# Patient Record
Sex: MALE | Race: WHITE | NOT HISPANIC OR LATINO | Employment: OTHER | URBAN - METROPOLITAN AREA
[De-identification: names, ages, dates, MRNs, and addresses within clinical notes are randomized per-mention and may not be internally consistent; named-entity substitution may affect disease eponyms.]

---

## 2017-01-23 ENCOUNTER — OFFICE VISIT (OUTPATIENT)
Dept: NEUROSURGERY | Facility: CLINIC | Age: 52
End: 2017-01-23

## 2017-01-23 VITALS — BODY MASS INDEX: 31.71 KG/M2 | WEIGHT: 202 LBS | TEMPERATURE: 97.4 F | HEIGHT: 67 IN

## 2017-01-23 DIAGNOSIS — M50.30 DDD (DEGENERATIVE DISC DISEASE), CERVICAL: Primary | ICD-10-CM

## 2017-01-23 PROCEDURE — 99212 OFFICE O/P EST SF 10 MIN: CPT | Performed by: NEUROLOGICAL SURGERY

## 2017-01-23 NOTE — MR AVS SNAPSHOT
Charles Finney   1/23/2017 1:15 PM   Office Visit    Dept Phone:  [phone]   Encounter #:  [id number]    Provider:  Rakesh Best MD   Department:  [facility]                Your Full Care Plan              Your Updated Medication List          This list is accurate as of: 1/23/17  2:15 PM.  Always use your most recent med list.                albuterol 108 (90 BASE) MCG/ACT inhaler   Commonly known as:  PROVENTIL HFA;VENTOLIN HFA       diclofenac 75 MG EC tablet   Commonly known as:  VOLTAREN       EPIPEN JR 2-SYED 0.15 MG/0.3ML solution auto-injector injection   Generic drug:  EPINEPHrine       fluticasone 50 MCG/ACT nasal spray   Commonly known as:  FLONASE       gabapentin 100 MG capsule   Commonly known as:  NEURONTIN   Take 1 cap QD for 3 days, then take 1 cap BID for 3 days, then take 1 cap TID for 3 days, then take 1 cap QID and continue       lisinopril 20 MG tablet   Commonly known as:  PRINIVIL,ZESTRIL       nortriptyline 10 MG capsule   Commonly known as:  PAMELOR   Take 1-2 capsules QHS prn sleep/pain       pravastatin 40 MG tablet   Commonly known as:  PRAVACHOL               Instructions     None    Patient Instructions History      Upcoming Appointments     Visit Type Date Time Department    OFFICE VISIT 1/23/2017  1:15 PM [facility]      [facility]     Our records indicate that you have an active [employer] account.    You can view your After Visit Summary by going to [employer] and logging in with your [employer] username and password.  If you don't have a [employer] username and password but a parent or guardian has access to your record, the parent or guardian should login with their own [employer] username and password and access your record to view the After Visit Summary.    If you have questions, you can email [email or web address] or call [phone] to talk to our [employer]  "staff.  Remember, MyChart is NOT to be used for urgent needs.  For medical emergencies, dial 911.               Other Info from Your Visit           Allergies     Other      Environmental allergies    Penicillins        Reason for Visit     Follow-up           Vital Signs     Temperature Height Weight Body Mass Index Smoking Status       97.4 °F (36.3 °C) 67\" (170.2 cm) 202 lb (91.6 kg) 31.64 kg/m2 Never Smoker         "

## 2017-01-23 NOTE — LETTER
January 23, 2017     Caridad Kaba MD  [address] [place] [id number]    Patient: Charles Finney   YOB: 1965   Date of Visit: 1/23/2017       Dear Dr. Sendy MD:    Charles Finney was in my office today. Below is a copy of my note.    If you have questions, please do not hesitate to call me. I look forward to following Charles along with you.         Sincerely,        Rakesh Best MD        CC: MD Latrell Maddox MD    Subjective     Chief Complaint: Neck pain    Patient ID: Charles Finney is a 51 y.o. male is here today for follow-up.    Neck Pain    This is a chronic problem. The current episode started more than 1 month ago. The problem occurs intermittently. The problem has been waxing and waning. The pain is associated with nothing. The pain is present in the left side. The quality of the pain is described as aching and cramping. The pain is at a severity of 1/10. The pain is moderate. The pain is same all the time. Pertinent negatives include no chest pain, fever, headaches, numbness, pain with swallowing, photophobia, trouble swallowing or weakness. He has tried acetaminophen, home exercises, NSAIDs and ice for the symptoms.       This is a 51-year-old man with severe degenerative disc disease and a history of mild neck pain.  I did not recommend surgery based on my initial assessment of him last year.  He subsequently has started some nortriptyline and gabapentin which has significantly improved his symptoms.  He reports that he is happy and his discomfort is minimal at this point.    The following portions of the patient's history were reviewed and updated as appropriate: allergies, current medications, past family history, past medical history, past social history, past surgical history and problem list.    Family history:   Family History   Problem Relation Age of Onset   • Breast cancer Mother    • Diabetes Father    • Kidney failure Father        Social  history:   Social History     Social History   • Marital status: Single     Spouse name: N/A   • Number of children: N/A   • Years of education: N/A     Occupational History   • Not on file.     Social History Main Topics   • Smoking status: Never Smoker   • Smokeless tobacco: Never Used   • Alcohol use Yes      Comment: Rarely   • Drug use: No   • Sexual activity: Not on file     Other Topics Concern   • Not on file     Social History Narrative       Review of Systems   Constitutional: Negative for activity change, appetite change, chills, diaphoresis, fatigue, fever and unexpected weight change.   HENT: Negative for congestion, dental problem, drooling, ear discharge, ear pain, facial swelling, hearing loss, mouth sores, nosebleeds, postnasal drip, rhinorrhea, sinus pressure, sneezing, sore throat, tinnitus, trouble swallowing and voice change.    Eyes: Negative for photophobia, pain, discharge, redness, itching and visual disturbance.   Respiratory: Negative for apnea, cough, choking, chest tightness, shortness of breath, wheezing and stridor.    Cardiovascular: Negative for chest pain, palpitations and leg swelling.   Gastrointestinal: Negative for abdominal distention, abdominal pain, anal bleeding, blood in stool, constipation, diarrhea, nausea, rectal pain and vomiting.   Musculoskeletal: Positive for back pain, neck pain and neck stiffness. Negative for arthralgias, gait problem, joint swelling and myalgias.   Skin: Negative for color change, pallor, rash and wound.   Allergic/Immunologic: Negative for environmental allergies, food allergies and immunocompromised state.   Neurological: Negative for dizziness, tremors, seizures, syncope, facial asymmetry, speech difficulty, weakness, light-headedness, numbness and headaches.   Hematological: Negative for adenopathy. Does not bruise/bleed easily.   Psychiatric/Behavioral: Negative for agitation, behavioral problems, confusion, decreased concentration,  "dysphoric mood, self-injury, sleep disturbance and suicidal ideas. The patient is not nervous/anxious and is not hyperactive.        Objective   Temperature 97.4 °F (36.3 °C), height 67\" (170.2 cm), weight 202 lb (91.6 kg).  Body mass index is 31.64 kg/(m^2).    Physical Exam   Constitutional: He is oriented to person, place, and time. He appears well-developed.  Non-toxic appearance.   HENT:   Head: Normocephalic and atraumatic.   Right Ear: Hearing normal.   Left Ear: Hearing normal.   Nose: Nose normal.   Eyes: Conjunctivae, EOM and lids are normal. Pupils are equal, round, and reactive to light.   Neck: No JVD present. No muscular tenderness present. Decreased range of motion present.   Cardiovascular: Normal rate and regular rhythm.    Pulses:       Radial pulses are 2+ on the right side, and 2+ on the left side.   Pulmonary/Chest: Effort normal. No stridor. No respiratory distress. He has no wheezes.   Neurological: He is alert and oriented to person, place, and time. He has normal reflexes. He displays normal reflexes. No cranial nerve deficit. He exhibits normal muscle tone. GCS eye subscore is 4. GCS verbal subscore is 5. GCS motor subscore is 6.   Reflex Scores:       Tricep reflexes are 2+ on the right side and 2+ on the left side.       Bicep reflexes are 2+ on the right side and 2+ on the left side.       Brachioradialis reflexes are 2+ on the right side and 2+ on the left side.  Skin: Skin is warm and dry. No rash noted. No erythema.   Psychiatric: He has a normal mood and affect. His behavior is normal. Judgment and thought content normal.   Nursing note and vitals reviewed.        Assessment/Plan     Independent Review of Radiographic Studies:      He has no new imaging for me to review    Medical Decision Making:      This is a 51-year-old man with degenerative disc disease of the cervical spine.  His symptoms have significantly improved since starting gabapentin and nortriptyline.  I reviewed the " signs and symptoms of cervical myelopathy and cervical radiculopathy with him.  I directed him to contact my office with any new, or worsening symptoms.  I would like to follow up with him in 6 months if he needs it, otherwise he can follow-up with me on an as-needed basis.    There are no diagnoses linked to this encounter.    No Follow-up on file.           This document signed by ROLANDO Best MD January 23, 2017 1:46 PM

## 2017-01-23 NOTE — PROGRESS NOTES
Subjective     Chief Complaint: Neck pain    Patient ID: Charles Finney is a 51 y.o. male is here today for follow-up.    Neck Pain    This is a chronic problem. The current episode started more than 1 month ago. The problem occurs intermittently. The problem has been waxing and waning. The pain is associated with nothing. The pain is present in the left side. The quality of the pain is described as aching and cramping. The pain is at a severity of 1/10. The pain is moderate. The pain is same all the time. Pertinent negatives include no chest pain, fever, headaches, numbness, pain with swallowing, photophobia, trouble swallowing or weakness. He has tried acetaminophen, home exercises, NSAIDs and ice for the symptoms.       This is a 51-year-old man with severe degenerative disc disease and a history of mild neck pain.  I did not recommend surgery based on my initial assessment of him last year.  He subsequently has started some nortriptyline and gabapentin which has significantly improved his symptoms.  He reports that he is happy and his discomfort is minimal at this point.    The following portions of the patient's history were reviewed and updated as appropriate: allergies, current medications, past family history, past medical history, past social history, past surgical history and problem list.    Family history:   Family History   Problem Relation Age of Onset   • Breast cancer Mother    • Diabetes Father    • Kidney failure Father        Social history:   Social History     Social History   • Marital status: Single     Spouse name: N/A   • Number of children: N/A   • Years of education: N/A     Occupational History   • Not on file.     Social History Main Topics   • Smoking status: Never Smoker   • Smokeless tobacco: Never Used   • Alcohol use Yes      Comment: Rarely   • Drug use: No   • Sexual activity: Not on file     Other Topics Concern   • Not on file     Social History Narrative       Review of Systems  "  Constitutional: Negative for activity change, appetite change, chills, diaphoresis, fatigue, fever and unexpected weight change.   HENT: Negative for congestion, dental problem, drooling, ear discharge, ear pain, facial swelling, hearing loss, mouth sores, nosebleeds, postnasal drip, rhinorrhea, sinus pressure, sneezing, sore throat, tinnitus, trouble swallowing and voice change.    Eyes: Negative for photophobia, pain, discharge, redness, itching and visual disturbance.   Respiratory: Negative for apnea, cough, choking, chest tightness, shortness of breath, wheezing and stridor.    Cardiovascular: Negative for chest pain, palpitations and leg swelling.   Gastrointestinal: Negative for abdominal distention, abdominal pain, anal bleeding, blood in stool, constipation, diarrhea, nausea, rectal pain and vomiting.   Musculoskeletal: Positive for back pain, neck pain and neck stiffness. Negative for arthralgias, gait problem, joint swelling and myalgias.   Skin: Negative for color change, pallor, rash and wound.   Allergic/Immunologic: Negative for environmental allergies, food allergies and immunocompromised state.   Neurological: Negative for dizziness, tremors, seizures, syncope, facial asymmetry, speech difficulty, weakness, light-headedness, numbness and headaches.   Hematological: Negative for adenopathy. Does not bruise/bleed easily.   Psychiatric/Behavioral: Negative for agitation, behavioral problems, confusion, decreased concentration, dysphoric mood, self-injury, sleep disturbance and suicidal ideas. The patient is not nervous/anxious and is not hyperactive.        Objective   Temperature 97.4 °F (36.3 °C), height 67\" (170.2 cm), weight 202 lb (91.6 kg).  Body mass index is 31.64 kg/(m^2).    Physical Exam   Constitutional: He is oriented to person, place, and time. He appears well-developed.  Non-toxic appearance.   HENT:   Head: Normocephalic and atraumatic.   Right Ear: Hearing normal.   Left Ear: Hearing " normal.   Nose: Nose normal.   Eyes: Conjunctivae, EOM and lids are normal. Pupils are equal, round, and reactive to light.   Neck: No JVD present. No muscular tenderness present. Decreased range of motion present.   Cardiovascular: Normal rate and regular rhythm.    Pulses:       Radial pulses are 2+ on the right side, and 2+ on the left side.   Pulmonary/Chest: Effort normal. No stridor. No respiratory distress. He has no wheezes.   Neurological: He is alert and oriented to person, place, and time. He has normal reflexes. He displays normal reflexes. No cranial nerve deficit. He exhibits normal muscle tone. GCS eye subscore is 4. GCS verbal subscore is 5. GCS motor subscore is 6.   Reflex Scores:       Tricep reflexes are 2+ on the right side and 2+ on the left side.       Bicep reflexes are 2+ on the right side and 2+ on the left side.       Brachioradialis reflexes are 2+ on the right side and 2+ on the left side.  Skin: Skin is warm and dry. No rash noted. No erythema.   Psychiatric: He has a normal mood and affect. His behavior is normal. Judgment and thought content normal.   Nursing note and vitals reviewed.        Assessment/Plan     Independent Review of Radiographic Studies:      He has no new imaging for me to review    Medical Decision Making:      This is a 51-year-old man with degenerative disc disease of the cervical spine.  His symptoms have significantly improved since starting gabapentin and nortriptyline.  I reviewed the signs and symptoms of cervical myelopathy and cervical radiculopathy with him.  I directed him to contact my office with any new, or worsening symptoms.  I would like to follow up with him in 6 months if he needs it, otherwise he can follow-up with me on an as-needed basis.    There are no diagnoses linked to this encounter.    No Follow-up on file.           This document signed by ROLANDO Best MD January 23, 2017 1:46 PM

## 2017-02-20 RX ORDER — GABAPENTIN 100 MG/1
100 CAPSULE ORAL 4 TIMES DAILY
Qty: 120 CAPSULE | Refills: 6 | Status: SHIPPED | OUTPATIENT
Start: 2017-02-20 | End: 2017-09-01 | Stop reason: SDUPTHER

## 2017-02-20 RX ORDER — NORTRIPTYLINE HYDROCHLORIDE 10 MG/1
CAPSULE ORAL
Qty: 60 CAPSULE | Refills: 6 | Status: SHIPPED | OUTPATIENT
Start: 2017-02-20 | End: 2017-10-06

## 2017-02-20 RX ORDER — NORTRIPTYLINE HYDROCHLORIDE 10 MG/1
CAPSULE ORAL
Qty: 60 CAPSULE | Refills: 0 | Status: SHIPPED | OUTPATIENT
Start: 2017-02-20 | End: 2017-02-20 | Stop reason: SDUPTHER

## 2017-02-20 RX ORDER — NORTRIPTYLINE HYDROCHLORIDE 10 MG/1
CAPSULE ORAL
Qty: 60 CAPSULE | Refills: 6 | Status: SHIPPED | OUTPATIENT
Start: 2017-02-20 | End: 2017-02-20 | Stop reason: SDUPTHER

## 2017-02-20 RX ORDER — GABAPENTIN 100 MG/1
100 CAPSULE ORAL 4 TIMES DAILY
Qty: 120 CAPSULE | Refills: 6 | Status: SHIPPED | OUTPATIENT
Start: 2017-02-20 | End: 2017-02-20 | Stop reason: SDUPTHER

## 2017-09-01 RX ORDER — GABAPENTIN 100 MG/1
100 CAPSULE ORAL 4 TIMES DAILY
Qty: 120 CAPSULE | Refills: 5 | OUTPATIENT
Start: 2017-09-01 | End: 2017-10-06

## 2017-10-02 ENCOUNTER — TELEPHONE (OUTPATIENT)
Dept: NEUROSURGERY | Facility: CLINIC | Age: 52
End: 2017-10-02

## 2017-10-02 NOTE — TELEPHONE ENCOUNTER
Provider:  Nasir  Caller: patient  Time of call:  12:58   Phone #:  [phone]  Surgery:  no  Surgery Date:    Last visit :01/23/17     Next visit: None    RODDY:         Reason for call:       Patient is having increased pain with his cervical DDD.  He wants to know Dr. Best thoughts regarding seeing a chiropractor, doing possible acupuncture or using a conversion table.  Are any and all of these things ok for him to try?

## 2017-10-02 NOTE — TELEPHONE ENCOUNTER
Called patient and gave him this message. He is going to do some research on chiropractors and make a discission from there.

## 2017-10-02 NOTE — TELEPHONE ENCOUNTER
Dr. Best does not generally tell people to go to a chiropractor, but if he would like to go to one, he is not opposed.  He may utilize acupuncture.  He may use a conversion table under guidance..

## 2017-10-06 ENCOUNTER — OFFICE VISIT (OUTPATIENT)
Dept: NEUROSURGERY | Facility: CLINIC | Age: 52
End: 2017-10-06

## 2017-10-06 VITALS
SYSTOLIC BLOOD PRESSURE: 110 MMHG | TEMPERATURE: 97.6 F | BODY MASS INDEX: 33.21 KG/M2 | WEIGHT: 211.6 LBS | HEIGHT: 67 IN | DIASTOLIC BLOOD PRESSURE: 70 MMHG

## 2017-10-06 DIAGNOSIS — M50.30 DDD (DEGENERATIVE DISC DISEASE), CERVICAL: Primary | ICD-10-CM

## 2017-10-06 PROCEDURE — 99214 OFFICE O/P EST MOD 30 MIN: CPT | Performed by: NEUROLOGICAL SURGERY

## 2017-10-06 RX ORDER — AMLODIPINE BESYLATE 5 MG/1
TABLET ORAL
COMMUNITY
Start: 2017-09-28 | End: 2019-05-08

## 2017-10-06 RX ORDER — PRAVASTATIN SODIUM 80 MG/1
TABLET ORAL
COMMUNITY
Start: 2017-09-05 | End: 2022-11-29

## 2017-10-06 NOTE — PROGRESS NOTES
Subjective     Chief Complaint: Neck pain    Patient ID: Charles Finney is a 52 y.o. male is here today for follow-up.    Neck Pain    This is a chronic problem. The current episode started more than 1 year ago. The problem occurs constantly. The problem has been waxing and waning. The pain is associated with nothing. The pain is present in the midline and occipital region. The quality of the pain is described as aching. The pain is at a severity of 6/10. The pain is moderate. The symptoms are aggravated by bending, position and twisting. The pain is worse during the night. Stiffness is present in the morning. Pertinent negatives include no chest pain, fever, headaches, leg pain, numbness, pain with swallowing, paresis, photophobia, syncope, tingling, trouble swallowing, visual change, weakness or weight loss. He has tried chiropractic manipulation, home exercises and NSAIDs for the symptoms. The treatment provided moderate relief.       This is a 52-year-old man who I saw in consultation about 10 months ago for some chronic neck pain.  I sent him for some physical therapy, and reported that this was effective at alleviating his pain.  He has had several bouts of intermittent flareups of his neck pain over the past few months, and he called my office asking about additional conservative treatments.  I asked him to return to my office to review his symptoms, and discussed the role of pain management, and physical therapy in the management of axial neck pain.    He endorses primarily neck pain with occasional radiating pain into his right shoulder blade.  He denies radiating pain or numbness into his arms or hands.    The following portions of the patient's history were reviewed and updated as appropriate: allergies, current medications, past family history, past medical history, past social history, past surgical history and problem list.    Family history:   Family History   Problem Relation Age of Onset   • Breast  cancer Mother    • Diabetes Father    • Kidney failure Father        Social history:   Social History     Social History   • Marital status: Single     Spouse name: N/A   • Number of children: N/A   • Years of education: N/A     Occupational History   • Not on file.     Social History Main Topics   • Smoking status: Never Smoker   • Smokeless tobacco: Never Used   • Alcohol use Yes      Comment: Rarely   • Drug use: No   • Sexual activity: Not on file     Other Topics Concern   • Not on file     Social History Narrative       Review of Systems   Constitutional: Negative for activity change, appetite change, chills, diaphoresis, fatigue, fever, unexpected weight change and weight loss.   HENT: Negative for congestion, dental problem, drooling, ear discharge, ear pain, facial swelling, hearing loss, mouth sores, nosebleeds, postnasal drip, rhinorrhea, sinus pressure, sneezing, sore throat, tinnitus, trouble swallowing and voice change.    Eyes: Negative for photophobia, pain, discharge, redness, itching and visual disturbance.   Respiratory: Negative for apnea, cough, choking, chest tightness, shortness of breath, wheezing and stridor.    Cardiovascular: Negative for chest pain, palpitations, leg swelling and syncope.   Gastrointestinal: Negative for abdominal distention, abdominal pain, anal bleeding, blood in stool, constipation, diarrhea, nausea, rectal pain and vomiting.   Endocrine: Negative for cold intolerance, heat intolerance, polydipsia, polyphagia and polyuria.   Genitourinary: Negative for decreased urine volume, difficulty urinating, dysuria, enuresis, flank pain, frequency, genital sores, hematuria and urgency.   Musculoskeletal: Positive for arthralgias and myalgias. Negative for back pain, gait problem, joint swelling, neck pain and neck stiffness.   Skin: Negative for color change, pallor, rash and wound.   Allergic/Immunologic: Negative for environmental allergies, food allergies and  "immunocompromised state.   Neurological: Negative for dizziness, tingling, tremors, seizures, syncope, facial asymmetry, speech difficulty, weakness, light-headedness, numbness and headaches.   Hematological: Negative for adenopathy. Does not bruise/bleed easily.   Psychiatric/Behavioral: Negative for agitation, behavioral problems, confusion, decreased concentration, dysphoric mood, hallucinations, self-injury, sleep disturbance and suicidal ideas. The patient is not nervous/anxious and is not hyperactive.    All other systems reviewed and are negative.      Objective   Blood pressure 110/70, temperature 97.6 °F (36.4 °C), temperature source Temporal Artery , height 67\" (170.2 cm), weight 211 lb 9.6 oz (96 kg).  Body mass index is 33.14 kg/(m^2).    Physical Exam   Constitutional: He is oriented to person, place, and time. He appears well-developed.  Non-toxic appearance.   HENT:   Head: Normocephalic and atraumatic.   Right Ear: Hearing normal.   Left Ear: Hearing normal.   Nose: Nose normal.   Eyes: Conjunctivae, EOM and lids are normal. Pupils are equal, round, and reactive to light.   Neck: Normal range of motion. No JVD present.   Cardiovascular: Normal rate and regular rhythm.    Pulses:       Radial pulses are 2+ on the right side, and 2+ on the left side.   Pulmonary/Chest: Effort normal. No stridor. No respiratory distress. He has no wheezes.   Musculoskeletal:        Cervical back: He exhibits decreased range of motion.   Neurological: He is alert and oriented to person, place, and time. He has normal reflexes. He displays normal reflexes. No cranial nerve deficit. He exhibits normal muscle tone. GCS eye subscore is 4. GCS verbal subscore is 5. GCS motor subscore is 6.   Reflex Scores:       Tricep reflexes are 2+ on the right side and 2+ on the left side.       Bicep reflexes are 2+ on the right side and 2+ on the left side.       Brachioradialis reflexes are 2+ on the right side and 2+ on the left " side.  Skin: Skin is warm and dry. No rash noted. No erythema.   Psychiatric: He has a normal mood and affect. His behavior is normal. Judgment and thought content normal.   Nursing note and vitals reviewed.        Assessment/Plan     Independent Review of Radiographic Studies:      He has no new imaging for me to review    Medical Decision Making:      This is a 52-year-old man with intermittent, chronic neck pain.  He has no real complaints of cervical radiculopathy.  A lengthy, and protracted conversation was held with the patient regarding the role of pain management and physical therapy in the management of chronic, axial neck pain.    I did review the signs and symptoms of cervical radiculopathy and cervical myelopathy with him.  I directed him to contact my office with new, or worsening symptoms.    He can follow-up with me on an as-needed basis.    Charles was seen today for neck pain.    Diagnoses and all orders for this visit:    DDD (degenerative disc disease), cervical  -     Ambulatory Referral to Physical Therapy Evaluate and treat      Return if symptoms worsen or fail to improve.           This document signed by ROLANDO Best MD October 6, 2017 1:26 PM

## 2017-11-14 ENCOUNTER — LAB REQUISITION (OUTPATIENT)
Dept: LAB | Facility: HOSPITAL | Age: 52
End: 2017-11-14

## 2017-11-14 DIAGNOSIS — D12.5 BENIGN NEOPLASM OF SIGMOID COLON: ICD-10-CM

## 2017-11-14 DIAGNOSIS — D12.3 BENIGN NEOPLASM OF TRANSVERSE COLON: ICD-10-CM

## 2017-11-14 DIAGNOSIS — Z12.11 ENCOUNTER FOR SCREENING FOR MALIGNANT NEOPLASM OF COLON: ICD-10-CM

## 2017-11-14 PROCEDURE — 88305 TISSUE EXAM BY PATHOLOGIST: CPT | Performed by: INTERNAL MEDICINE

## 2017-11-15 LAB
CYTO UR: NORMAL
LAB AP CASE REPORT: NORMAL
LAB AP CLINICAL INFORMATION: NORMAL
Lab: NORMAL
PATH REPORT.FINAL DX SPEC: NORMAL
PATH REPORT.GROSS SPEC: NORMAL

## 2018-10-03 ENCOUNTER — TELEPHONE (OUTPATIENT)
Dept: PAIN MEDICINE | Facility: CLINIC | Age: 53
End: 2018-10-03

## 2018-10-04 ENCOUNTER — TELEPHONE (OUTPATIENT)
Dept: NEUROSURGERY | Facility: CLINIC | Age: 53
End: 2018-10-04

## 2018-10-10 NOTE — PROGRESS NOTES
"Chief Complaint: \"I have pain in the right side of my neck and right arm.\"    History of Present Illness:   Patient: Mr. Charles Finney, 51 y.o. male   Pain history: Patient reports a 3-4 year history of pain, which began without incident. He initially saw Dr. Solis in 2016, and epidurals were discussed, but were never scheduled.  Patient states his pain had improved somewhat until sharpening a mower blade at the end of September.  Patient reports neck stiffness upon recurrence of symptoms, but this seems to have improved.  He reports that his pain has actually improved some this week after re-starting gabapentin.  TENS unit has also been helpful.  Pain description: constant pain with intermittent exacerbation, described as aching and burning sensation.   Radiation of pain: The pain radiates from the neck to the right shoulder and into the right hand.  The pain in the right shoulder is more severe than the neck and arm pain.   Pain intensity today: 3/10 (shoulder), 2/10 (neck)  Average pain intensity last week: 8/10 (shoulder), 8/10 (neck)  Pain intensity ranges from: 2-3/10 to 8/10  Aggravating factors: Pain increases with flexion and rotation of the cervical spine   Alleviating factors: Pain decreases with gabapentin, Tylenol, and TENS unit   Associated symptoms:   Patient denies numbness or weakness in the upper extremities  Patient denies any new bladder or bowel problems.   Patient denies difficulties with his balance.   Patient denies headaches.  In terms of current analgesics, Charles Finney takes: diclofenac, Tylenol, and gabapentin  Charles Finney has already failed the following measures, including:   Conservative measures: oral analgesics, topical analgesics, physical therapy, ice, heat and home exercises, NSAIDs   Interventional measures: None  Surgical measures: None  Charles Finney underwent neurosurgical  consultation with Dr. Best on 10/06/2017, and was found not to be a surgical " candidate.  Charles Finney has a history of hypertension and is engaged in treatment.  RODDY Report #[id number] consistent to medication reconciliation.    Review of diagnostic Studies:   MRI, cervical spine, without, 08/30/2016; The cord signal is normal. C3-C4: Facet hypertrophy, disc osteophyte complex, moderate central canal narrowing. C4-C5: Facet and uncovertebral joint hypertrophy. Disc osteophyte. Moderate to severe central canal and bilateral neural foraminal narrowing. C5-C6: Facet and uncovertebral joint hypertrophy. Disc osteophyte. Severe central canal and bilateral neural foraminal narrowing C6-C7: Facet and uncovertebral joint hypertrophy. Mild central canal and bilateral neural foraminal narrowing. C7-T1: Facet hypertrophy no stenosis.    Global Pain Scale 10-          Pain 12          Feelings 0          Clinical outcomes 19          Activities 18          GPS Total: 49            Review of Systems   Respiratory: Positive for apnea.    Musculoskeletal: Positive for back pain, neck pain and neck stiffness.   Neurological: Positive for numbness.   All other systems reviewed and are negative.     Patient Active Problem List   Diagnosis   • Cervical stenosis of spinal canal   • Foraminal stenosis of cervical region   • Cervical spondylosis without myelopathy   • DDD (degenerative disc disease), cervical   • Essential hypertension   • Mild obesity   • Hypercholesterolemia   • Mild intermittent asthma without complication     Past Medical History:   Diagnosis Date   • Asthma    • Back problem    • Cervical disc disorder    • Chronic pain disorder    • Hypertension    • Joint pain    • Low back pain    • Lumbosacral disc disease    • Neck pain    • Umbilical hernia       Past Surgical History:   Procedure Laterality Date   • KIDNEY STONE SURGERY  01/01/2013    Lithotripsy   • KNEE CARTILAGE SURGERY  01/01/1991   • NASAL SEPTUM SURGERY  05/01/2016   • TONSILLECTOMY     • UMBILICAL HERNIA REPAIR   "01/01/1999         Family History   Problem Relation Age of Onset   • Breast cancer Mother    • Diabetes Father    • Kidney failure Father          Social History     Social History   • Marital status: Single     Social History Main Topics   • Smoking status: Never Smoker   • Smokeless tobacco: Never Used   • Alcohol use Yes      Comment: Rarely   • Drug use: No     Other Topics Concern   • Not on file        Current Outpatient Prescriptions:   •  albuterol (PROVENTIL HFA;VENTOLIN HFA) 108 (90 BASE) MCG/ACT inhaler, Inhale 2 puffs every 4 (four) hours as needed for wheezing., Disp: , Rfl:   •  amLODIPine (NORVASC) 5 MG tablet, , Disp: , Rfl:   •  DICLOFENAC PO, Take  by mouth., Disp: , Rfl:   •  FLOVENT DISKUS 250 MCG/BLIST aerosol powder , , Disp: , Rfl:   •  fluticasone (FLONASE) 50 MCG/ACT nasal spray, 2 sprays into each nostril daily., Disp: , Rfl:   •  gabapentin (NEURONTIN) 100 MG capsule, Take 100 mg by mouth 3 (Three) Times a Day., Disp: , Rfl:   •  lisinopril (PRINIVIL,ZESTRIL) 20 MG tablet, Take 20 mg by mouth 2 (Two) Times a Day., Disp: , Rfl:   •  nortriptyline (PAMELOR) 10 MG capsule, Take 10 mg by mouth Every Night., Disp: , Rfl:   •  pravastatin (PRAVACHOL) 80 MG tablet, , Disp: , Rfl:       Allergies   Allergen Reactions   • Other      Environmental allergies     • Penicillins          /88   Pulse 80   Temp 96.9 °F (36.1 °C) (Temporal Artery )   Resp 18   Ht 170.2 cm (67\")   Wt 98.2 kg (216 lb 6.4 oz)   SpO2 96%   BMI 33.89 kg/m²    Physical Exam:  Constitutional: Patient is oriented to person, place, and time.  Patient appears well-developed and well-nourished.   HENT: Head: Normocephalic and atraumatic. Eyes: Conjunctivae and lids are normal. Pupils: Equal, round, reactive to light.   Neck: Trachea normal. Neck supple. No JVD present.   Lymphatic: No cervical adenopathy  Musculoskeletal   Gait and station: Gait evaluation demonstrated a normal gait.   Cervical spine: Passive and active " range of motion is decreased and without pain. Extension, flexion, lateral flexion, rotation of the cervical spine did not increase or reproduce pain. Cervical facet joint loading maneuvers are negative.   Shoulders: The range of motion of the glenohumeral joints is full and without pain. Rotator cuff strength is 5/5.   Neurological:   Patient is alert and oriented to person, place, and time. Speech: speech is normal. Cortical function: Normal mental status.   Cranial nerves: Cranial nerves 2-12 intact.   Reflex Scores:  Right brachioradialis: 2+  Left brachioradialis: 2+  Right biceps: 2+  Left biceps: 2+  Right triceps: 2+  Left triceps: 2+  Right patellar: 2+  Left patellar: 2+  Right achilles: 2+  Left achilles: 2+  Motor strength: 5/5  Motor Tone: normal tone.   Involuntary movements: none.   Superficial/Primitive Reflexes: primitive reflexes were absent.   Right Pierre: absent  Left Pierre: absent  Right ankle clonus: absent  Left ankle clonus: absent   Spurling sign is negative.   Sensation: No sensory loss. Sensory exam: intact to light touch, intact pain and temperature sensation, intact vibration sensation and normal proprioception.   Coordination: Normal finger to nose and heel to shin. Normal balance and negative. Romberg's sign negative.   Skin and subcutaneous tissue: Skin is warm and intact. No rash noted. No cyanosis.   Psychiatric:   Judgment and insight: Normal.   Orientation to person, place and time: Normal.   Recent and remote memory: Intact.   Mood and affect: Normal.     ASSESSMENT:   1. Cervical stenosis of spinal canal    2. Foraminal stenosis of cervical region    3. DDD (degenerative disc disease), cervical    4. Cervical spondylosis without myelopathy      PLAN:  I have reviewed the above medical records, and discussed the patient with Dr. Solis.  Dr. Solis also examined the patient.  1. Interventional pain management measures: Patient will be scheduled for cervical epidural  steroid injection by right paramedian interlaminar approach. We may repeat another epidural depending on patient's outcome. Patient will follow-up with Dr. Best thereafter if he fails to experience lasting relief from epidural.  2. Long-term rehabilitation efforts:  A. Patient will start a comprehensive physical therapy program for upper body strengthening/posture correction, gait and balance training, neurodynamics, E-STIM, myofascial release, cupping and dry needling  B. Start an exercise program such as daily walks for fitness  3. Patient has been screened for tobacco use: Current tobacco non-user  4. Pharmacological measures:  A. Continue gabapentin 100 mg four times per day   B. Continue nortriptyline 10 mg 1-2 tablets at bedtime as needed  C. Continue diclofenac, as currently prescribed  5. The patient has been instructed to contact my office with any questions or difficulties. The patient understands the plan and agrees to proceed accordingly.       Patient Care Team:  Caridad Kaba MD as PCP - General (Family Medicine)  Rakesh Best MD as Consulting Physician (Neurosurgery)  John Del Castillo MD as Consulting Physician (Cardiology)  Latrell Solis MD as Consulting Physician (Anesthesiology)  Caridad Kaba MD as Referring Physician (Family Medicine)       No orders of the defined types were placed in this encounter.          Future Appointments  Date Time Provider Department Center   10/29/2018 10:00 AM Latrell Solis MD [facility] None         Thomas Brar PA-C

## 2018-10-11 ENCOUNTER — OFFICE VISIT (OUTPATIENT)
Dept: PAIN MEDICINE | Facility: CLINIC | Age: 53
End: 2018-10-11

## 2018-10-11 VITALS
HEIGHT: 67 IN | OXYGEN SATURATION: 96 % | DIASTOLIC BLOOD PRESSURE: 88 MMHG | SYSTOLIC BLOOD PRESSURE: 130 MMHG | RESPIRATION RATE: 18 BRPM | HEART RATE: 80 BPM | TEMPERATURE: 96.9 F | BODY MASS INDEX: 33.97 KG/M2 | WEIGHT: 216.4 LBS

## 2018-10-11 DIAGNOSIS — M50.30 DDD (DEGENERATIVE DISC DISEASE), CERVICAL: ICD-10-CM

## 2018-10-11 DIAGNOSIS — M48.02 FORAMINAL STENOSIS OF CERVICAL REGION: ICD-10-CM

## 2018-10-11 DIAGNOSIS — M48.02 CERVICAL STENOSIS OF SPINAL CANAL: Primary | ICD-10-CM

## 2018-10-11 DIAGNOSIS — M47.812 CERVICAL SPONDYLOSIS WITHOUT MYELOPATHY: ICD-10-CM

## 2018-10-11 PROCEDURE — 99213 OFFICE O/P EST LOW 20 MIN: CPT | Performed by: PHYSICIAN ASSISTANT

## 2018-10-11 RX ORDER — GABAPENTIN 100 MG/1
100 CAPSULE ORAL 3 TIMES DAILY
COMMUNITY
End: 2018-11-07 | Stop reason: SDUPTHER

## 2018-10-11 RX ORDER — NORTRIPTYLINE HYDROCHLORIDE 10 MG/1
10 CAPSULE ORAL NIGHTLY
COMMUNITY
End: 2020-01-08 | Stop reason: DRUGHIGH

## 2018-10-29 ENCOUNTER — OUTSIDE FACILITY SERVICE (OUTPATIENT)
Dept: PAIN MEDICINE | Facility: CLINIC | Age: 53
End: 2018-10-29

## 2018-10-29 DIAGNOSIS — M48.02 FORAMINAL STENOSIS OF CERVICAL REGION: ICD-10-CM

## 2018-10-29 DIAGNOSIS — M48.02 CERVICAL STENOSIS OF SPINAL CANAL: Primary | ICD-10-CM

## 2018-10-29 DIAGNOSIS — M47.812 CERVICAL SPONDYLOSIS WITHOUT MYELOPATHY: ICD-10-CM

## 2018-10-29 DIAGNOSIS — M50.30 DDD (DEGENERATIVE DISC DISEASE), CERVICAL: ICD-10-CM

## 2018-10-29 PROCEDURE — 62321 NJX INTERLAMINAR CRV/THRC: CPT | Performed by: ANESTHESIOLOGY

## 2018-10-29 PROCEDURE — 99152 MOD SED SAME PHYS/QHP 5/>YRS: CPT | Performed by: ANESTHESIOLOGY

## 2018-10-30 ENCOUNTER — TELEPHONE (OUTPATIENT)
Dept: PAIN MEDICINE | Facility: CLINIC | Age: 53
End: 2018-10-30

## 2018-10-30 NOTE — TELEPHONE ENCOUNTER
Patient had cervical epidural steroid injection on 10/29/2018. He reports that he is doing well. He doesn't have any pain or soreness. He isn't sure yet where he is going to go to physical therapy at

## 2018-11-07 RX ORDER — GABAPENTIN 100 MG/1
CAPSULE ORAL
Qty: 120 CAPSULE | Refills: 5 | OUTPATIENT
Start: 2018-11-07 | End: 2019-05-08 | Stop reason: SDUPTHER

## 2018-11-19 NOTE — PROGRESS NOTES
"Chief Complaint: \"I' doing great after the procedure.\"    History of Present Illness:   Patient: Mr. Charles Finney, 51 y.o. male with a 3-4 year history of neck pain, which began without incident.  He presents today for post-procedure follow-up.  Patient was last seen on 10/29/2018 for cervical epidural steroid injection at C6-C7 by right interlaminar approach.  Patient reportedly experienced at least 90% pain relief that is ongoing.  Patient is participating in Physical Therapy.    Pain description: Previously constant pain with intermittent exacerbation, described as aching and burning sensation.   Radiation of pain: The pain radiated from the neck to the right shoulder and into the right hand.  The pain in the right shoulder was more severe than the neck and arm pain.   Pain intensity today: 0/10  Average pain intensity last week: 1/10  Pain intensity ranges from: 0/10 to 2/10  Aggravating factors: Pain increased with flexion and rotation of the cervical spine.   Alleviating factors: Pain decreased with gabapentin, Tylenol, and TENS unit   Associated symptoms:   Patient denies numbness or weakness in the upper extremities  Patient denies any new bladder or bowel problems.   Patient denies difficulties with his balance.   Patient denies headaches.  In terms of current analgesics, Charles Finney takes: diclofenac, nortriptyline, and gabapentin  Charles Finney has already failed the following measures, including:   Conservative measures: oral analgesics, topical analgesics, physical therapy, ice, heat and home exercises, NSAIDs   Interventional measures: As referenced above.  Surgical measures: None  Charles Finney underwent neurosurgical  consultation with Dr. Best on 10/06/2017, and was found not to be a surgical candidate.  Charles Finney has a history of hypertension and is engaged in treatment.  R[facility] Report #[id number] consistent to medication reconciliation.    Review of Diagnostic Studies:   MRI, cervical " spine, without, 08/30/2016; The cord signal is normal. C3-C4: Facet hypertrophy, disc osteophyte complex, moderate central canal narrowing. C4-C5: Facet and uncovertebral joint hypertrophy. Disc osteophyte. Moderate to severe central canal and bilateral neural foraminal narrowing. C5-C6: Facet and uncovertebral joint hypertrophy. Disc osteophyte. Severe central canal and bilateral neural foraminal narrowing C6-C7: Facet and uncovertebral joint hypertrophy. Mild central canal and bilateral neural foraminal narrowing. C7-T1: Facet hypertrophy no stenosis.    Global Pain Scale 10- 11-         Pain 12 4         Feelings 0 0         Clinical outcomes 19 0         Activities 18 0         GPS Total: 49 4           Review of Systems   Musculoskeletal: Positive for back pain, neck pain and neck stiffness.   Neurological: Positive for numbness.   All other systems reviewed and are negative.     Patient Active Problem List   Diagnosis   • Cervical stenosis of spinal canal   • Foraminal stenosis of cervical region   • Cervical spondylosis without myelopathy   • DDD (degenerative disc disease), cervical   • Essential hypertension   • Mild obesity   • Hypercholesterolemia   • Mild intermittent asthma without complication     Past Medical History:   Diagnosis Date   • Asthma    • Back problem    • Cervical disc disorder    • Chronic pain disorder    • Hypertension    • Joint pain    • Low back pain    • Lumbosacral disc disease    • Neck pain    • Umbilical hernia       Past Surgical History:   Procedure Laterality Date   • KIDNEY STONE SURGERY  01/01/2013    Lithotripsy   • KNEE CARTILAGE SURGERY  01/01/1991   • NASAL SEPTUM SURGERY  05/01/2016   • TONSILLECTOMY     • UMBILICAL HERNIA REPAIR  01/01/1999         Family History   Problem Relation Age of Onset   • Breast cancer Mother    • Diabetes Father    • Kidney failure Father          Social History     Socioeconomic History   • Marital status: Single     Spouse  "name: Not on file   • Number of children: Not on file   • Years of education: Not on file   • Highest education level: Not on file   Tobacco Use   • Smoking status: Never Smoker   • Smokeless tobacco: Never Used   Substance and Sexual Activity   • Alcohol use: Yes     Comment: Rarely   • Drug use: No        Current Outpatient Medications:   •  albuterol (PROVENTIL HFA;VENTOLIN HFA) 108 (90 BASE) MCG/ACT inhaler, Inhale 2 puffs every 4 (four) hours as needed for wheezing., Disp: , Rfl:   •  amLODIPine (NORVASC) 5 MG tablet, , Disp: , Rfl:   •  DICLOFENAC PO, Take  by mouth., Disp: , Rfl:   •  FLOVENT DISKUS 250 MCG/BLIST aerosol powder , , Disp: , Rfl:   •  fluticasone (FLONASE) 50 MCG/ACT nasal spray, 2 sprays into each nostril daily., Disp: , Rfl:   •  gabapentin (NEURONTIN) 100 MG capsule, TAKE ONE CAPSULE BY MOUTH 4 TIMES DAILY, Disp: 120 capsule, Rfl: 5  •  lisinopril (PRINIVIL,ZESTRIL) 20 MG tablet, Take 20 mg by mouth 2 (Two) Times a Day., Disp: , Rfl:   •  nortriptyline (PAMELOR) 10 MG capsule, Take 10 mg by mouth Every Night., Disp: , Rfl:   •  pravastatin (PRAVACHOL) 80 MG tablet, , Disp: , Rfl:       Allergies   Allergen Reactions   • Other      Environmental allergies     • Penicillins          /80   Pulse 82   Temp 97.9 °F (36.6 °C) (Temporal)   Resp 18   Ht 170.2 cm (67\")   Wt 99.7 kg (219 lb 12.8 oz)   SpO2 98%   BMI 34.43 kg/m²    Physical Exam:  Constitutional: Patient is oriented to person, place, and time.  Patient appears well-developed and well-nourished.   HENT: Head: Normocephalic and atraumatic. Eyes: Conjunctivae and lids are normal. Pupils: Equal, round, reactive to light.   Neck: Trachea normal. Neck supple. No JVD present.   Lymphatic: No cervical adenopathy  Musculoskeletal   Gait and station: Normal.   Cervical spine: Passive and active range of motion is full and without pain. Extension, flexion, lateral flexion, rotation of the cervical spine did not increase or reproduce " pain. Cervical facet joint loading maneuvers are negative.   Shoulders: The range of motion of the glenohumeral joints is full and without pain. Rotator cuff strength is 5/5.   Neurological:   Patient is alert and oriented to person, place, and time. Speech: speech is normal. Cortical function: Normal mental status.   Cranial nerves: Cranial nerves 2-12 intact.   Reflex Scores:  Brachioradialis: 2+ bilaterally  Biceps: 2+ bilaterally  Triceps: 2+ bilaterally  Patellar: 2+ bilaterally  Achilles: 2+ bilaterally  Motor strength: 5/5  Motor Tone: normal tone.   Involuntary movements: none.   Right ankle clonus: absent  Left ankle clonus: absent   Spurling sign is negative.   Sensation: No sensory loss. Sensory exam: intact to light touch, intact pain and temperature sensation, intact vibration sensation and normal proprioception.   Coordination: Normal finger to nose and heel to shin. Normal balance and negative. Romberg's sign negative.   Skin and subcutaneous tissue: Skin is warm and intact. No rash noted. No cyanosis.   Psychiatric:   Judgment and insight: Normal.   Orientation to person, place and time: Normal.   Recent and remote memory: Intact.   Mood and affect: Normal.     ASSESSMENT:   1. Cervical stenosis of spinal canal    2. Foraminal stenosis of cervical region    3. DDD (degenerative disc disease), cervical    4. Cervical spondylosis without myelopathy      PLAN:  I have reviewed the above medical records, and discussed the patient with Dr. Solis.    1. Interventional pain management measures: None indicated at this time.  Patient can be scheduled for repeat cervical epidural steroid injection at C6-C7 by right paramedian interlaminar approach if pain recurs.  Patient will follow-up with Dr. Best thereafter if he fails to experience relief from epidural.  2. Long-term rehabilitation efforts:  A. Patient will continue a comprehensive physical therapy program for upper body strengthening/posture  correction, gait and balance training, neurodynamics, E-STIM, myofascial release, cupping and dry needling  B. Start an exercise program such as daily walks for fitness  3. Pharmacological measures:  A. Continue gabapentin 100 mg four times per day   B. Continue nortriptyline 10 mg 1-2 tablets at bedtime as needed  C. Continue diclofenac, as currently prescribed  4. The patient has been instructed to contact my office with any questions or difficulties. The patient understands the plan and agrees to proceed accordingly.       Patient Care Team:  Caridad Kaba MD as PCP - General (Family Medicine)  Rakesh Best MD as Consulting Physician (Neurosurgery)  John Del Castillo MD as Consulting Physician (Cardiology)  Latrell Solis MD as Consulting Physician (Anesthesiology)  Caridad Kaba MD as Referring Physician (Family Medicine)       No orders of the defined types were placed in this encounter.          No future appointments.      Thomas Brar PA-C

## 2018-11-26 ENCOUNTER — OFFICE VISIT (OUTPATIENT)
Dept: PAIN MEDICINE | Facility: CLINIC | Age: 53
End: 2018-11-26

## 2018-11-26 VITALS
DIASTOLIC BLOOD PRESSURE: 80 MMHG | WEIGHT: 219.8 LBS | BODY MASS INDEX: 34.5 KG/M2 | HEIGHT: 67 IN | RESPIRATION RATE: 18 BRPM | HEART RATE: 82 BPM | SYSTOLIC BLOOD PRESSURE: 118 MMHG | TEMPERATURE: 97.9 F | OXYGEN SATURATION: 98 %

## 2018-11-26 DIAGNOSIS — M47.812 CERVICAL SPONDYLOSIS WITHOUT MYELOPATHY: ICD-10-CM

## 2018-11-26 DIAGNOSIS — M48.02 CERVICAL STENOSIS OF SPINAL CANAL: Primary | ICD-10-CM

## 2018-11-26 DIAGNOSIS — M50.30 DDD (DEGENERATIVE DISC DISEASE), CERVICAL: ICD-10-CM

## 2018-11-26 DIAGNOSIS — M48.02 FORAMINAL STENOSIS OF CERVICAL REGION: ICD-10-CM

## 2018-11-26 PROCEDURE — 99213 OFFICE O/P EST LOW 20 MIN: CPT | Performed by: PHYSICIAN ASSISTANT

## 2019-05-07 RX ORDER — GABAPENTIN 100 MG/1
CAPSULE ORAL
Qty: 120 CAPSULE | Refills: 5 | OUTPATIENT
Start: 2019-05-07

## 2019-05-07 NOTE — TELEPHONE ENCOUNTER
Received refill request for Gabapentin. Patient last seen 11/26/18. Jag report # [id number] scanned to media. Called patient and scheduled med refill appointment with Thomas Brar PA-C

## 2019-05-07 NOTE — PROGRESS NOTES
"Chief Complaint: \"Medication refill.\"    History of Present Illness:   Patient: Mr. Charles Finney, 51 y.o. male with a 3-4 year history of neck pain, which began without incident.  He returns today for medication refill.  He takes 100 mg of gabapentin four times daily without adverse effects.  Patient was seen on 10/29/2018 for cervical epidural steroid injection at C6-C7 by right interlaminar approach and experienced at least 90% pain relief.  He reports that the pain is gradually recurring, but is still manageable.     Pain description: Previously constant pain with intermittent exacerbation, described as aching and burning sensation.   Radiation of pain: The pain radiates from the neck to the right shoulder and into the right hand.  The pain in the right shoulder was more severe than the neck and arm pain.   Pain intensity today: 0/10  Average pain intensity last week: 3/10  Pain intensity ranges from: 0/10 to 5/10  Aggravating factors: Pain increases with flexion and rotation of the cervical spine.   Alleviating factors: Pain decreases with gabapentin, Tylenol, and TENS unit   Associated symptoms:   Patient denies numbness or weakness in the upper extremities  Patient denies any new bladder or bowel problems.   Patient denies difficulties with his balance.   Patient denies headaches.  In terms of current analgesics, Charles Finney takes: nortriptyline, gabapentin  Charles Finney has already failed the following measures, including:   Conservative measures: oral analgesics, topical analgesics, physical therapy, ice, heat and home exercises, NSAIDs   Interventional measures: As referenced above.  Surgical measures: None  Charles Finney underwent neurosurgical  consultation with Dr. Best on 10/06/2017, and was found not to be a surgical candidate.  Charles Finney has a history of hypertension and is engaged in treatment.  RODDY Report #[id number], consistent to medication reconciliation.    Review of Diagnostic " Studies:   MRI, cervical spine without contrast- 08/30/2016: C3-C4: Facet hypertrophy, disc osteophyte complex, moderate central canal narrowing. C4-C5: Facet and uncovertebral joint hypertrophy. Disc osteophyte. Moderate to severe central canal and bilateral neural foraminal narrowing. C5-C6: Facet and uncovertebral joint hypertrophy. Disc osteophyte. Severe central canal and bilateral neural foraminal narrowing C6-C7: Facet and uncovertebral joint hypertrophy. Mild central canal and bilateral neural foraminal narrowing. C7-T1: Facet hypertrophy no stenosis.    Global Pain Scale 10-11-18 11-26-18 05-08-19        Pain 12 4 6        Feelings 0 0 0        Clinical outcomes 19 0 4        Activities 18 0 0        GPS Total: 49 4 10          Review of Systems   Constitutional: Negative for fever.   Cardiovascular: Negative for chest pain.   Gastrointestinal: Negative for abdominal pain.   Genitourinary: Negative for dysuria.   Musculoskeletal: Positive for back pain, neck pain and neck stiffness.   Neurological: Negative for weakness and headaches.   All other systems reviewed and are negative.     Patient Active Problem List   Diagnosis   • Cervical stenosis of spinal canal   • Foraminal stenosis of cervical region   • Cervical spondylosis without myelopathy   • DDD (degenerative disc disease), cervical   • Essential hypertension   • Mild obesity   • Hypercholesterolemia   • Mild intermittent asthma without complication     Past Medical History:   Diagnosis Date   • Asthma    • Back problem    • Cervical disc disorder    • Chronic pain disorder    • Hypertension    • Joint pain    • Low back pain    • Lumbosacral disc disease    • Neck pain    • Umbilical hernia       Past Surgical History:   Procedure Laterality Date   • KIDNEY STONE SURGERY  01/01/2013    Lithotripsy   • KNEE CARTILAGE SURGERY  01/01/1991   • NASAL SEPTUM SURGERY  05/01/2016   • TONSILLECTOMY     • UMBILICAL HERNIA REPAIR  01/01/1999         Family  "History   Problem Relation Age of Onset   • Breast cancer Mother    • Diabetes Father    • Kidney failure Father          Social History     Socioeconomic History   • Marital status: Single     Spouse name: Not on file   • Number of children: Not on file   • Years of education: Not on file   • Highest education level: Not on file   Tobacco Use   • Smoking status: Never Smoker   • Smokeless tobacco: Never Used   Substance and Sexual Activity   • Alcohol use: Yes     Comment: Rarely   • Drug use: No        Current Outpatient Medications:   •  albuterol (PROVENTIL HFA;VENTOLIN HFA) 108 (90 BASE) MCG/ACT inhaler, Inhale 2 puffs every 4 (four) hours as needed for wheezing., Disp: , Rfl:   •  FLOVENT DISKUS 250 MCG/BLIST aerosol powder , , Disp: , Rfl:   •  fluticasone (FLONASE) 50 MCG/ACT nasal spray, 2 sprays into each nostril daily., Disp: , Rfl:   •  gabapentin (NEURONTIN) 100 MG capsule, TAKE ONE CAPSULE BY MOUTH 4 TIMES DAILY, Disp: 120 capsule, Rfl: 5  •  lisinopril (PRINIVIL,ZESTRIL) 20 MG tablet, Take 20 mg by mouth 2 (Two) Times a Day., Disp: , Rfl:   •  nortriptyline (PAMELOR) 10 MG capsule, Take 10 mg by mouth Every Night., Disp: , Rfl:   •  pravastatin (PRAVACHOL) 80 MG tablet, , Disp: , Rfl:       Allergies   Allergen Reactions   • Other      Environmental allergies     • Penicillins          /80   Pulse 84   Temp 96.8 °F (36 °C) (Temporal)   Resp 18   Ht 170.2 cm (67\")   Wt 97.5 kg (215 lb)   SpO2 98%   BMI 33.67 kg/m²    Physical Exam:  Constitutional: Patient is oriented to person, place, and time.  Appears well-developed and well-nourished.   Head: Normocephalic and atraumatic. Eyes: Conjunctivae and lids are normal. Pupils: Equal, round, and reactive to light.   Neck: Trachea normal. Neck supple. No JVD present.   Lymphatic: No cervical adenopathy  Musculoskeletal   Gait and station: Normal.   Cervical spine: Passive and active range of motion is full and without pain. Extension, flexion, " lateral flexion, rotation of the cervical spine did not increase or reproduce pain. Cervical facet joint loading maneuvers are negative.   Shoulders: The range of motion of the glenohumeral joints is full and without pain. Rotator cuff strength is 5/5.   Neurological:   Patient is alert and oriented to person, place, and time. Speech: speech is normal. Cortical function: Normal mental status.   Cranial nerves: Cranial nerves 2-12 intact.   Reflex Scores:  Brachioradialis: 2+ bilaterally  Biceps: 2+ bilaterally  Triceps: 2+ bilaterally  Patellar: 2+ bilaterally  Achilles: 2+ bilaterally  Motor strength: 5/5  Motor Tone: normal tone.   Involuntary movements: none.   Right ankle clonus: absent  Left ankle clonus: absent   Spurling sign is negative.   Sensation: No sensory loss. Sensory exam: intact to light touch, intact pain and temperature sensation, intact vibration sensation and normal proprioception.   Coordination: Normal finger to nose and heel to shin. Normal balance and negative. Romberg's sign negative.   Skin and subcutaneous tissue: Skin is warm and intact. No rash noted. No cyanosis.   Psychiatric:   Judgment and insight: Normal.   Orientation to person, place and time: Normal.   Recent and remote memory: Intact.   Mood and affect: Normal.     ASSESSMENT:   1. Cervical stenosis of spinal canal    2. Foraminal stenosis of cervical region    3. DDD (degenerative disc disease), cervical    4. Cervical spondylosis without myelopathy      PLAN:  I have reviewed the above medical records, and discussed the patient with Dr. Solis.  We discussed repeating a cervical epidural injection and/or resuming PT, and patient declined.      1. Interventional pain management measures: None scheduled.  Patient could be scheduled for repeat cervical epidural steroid injection at C6-C7 by right paramedian interlaminar approach if pain recurs.  Patient will follow-up with Dr. Best thereafter if he continues to struggle with  pain.  2. Long-term rehabilitation efforts:  A. Start an exercise program such as daily walks for fitness  3. Pharmacological measures:  A. Continue gabapentin 100 mg four times per day.  Refilled.   B. Continue nortriptyline 10 mg 1-2 tablets at bedtime as needed  4. The patient has been instructed to contact my office with any questions or difficulties. The patient understands the plan and agrees to proceed accordingly.       Patient Care Team:  Caridad Kaba MD as PCP - General (Family Medicine)  Rakesh Best MD as Consulting Physician (Neurosurgery)  John Del Castillo MD as Consulting Physician (Cardiology)  Latrell Solis MD as Consulting Physician (Anesthesiology)  Thomas Brar PA-C as Physician Assistant (Physician Assistant)       No orders of the defined types were placed in this encounter.          No future appointments.      Thomas Brar PA-C

## 2019-05-08 ENCOUNTER — OFFICE VISIT (OUTPATIENT)
Dept: PAIN MEDICINE | Facility: CLINIC | Age: 54
End: 2019-05-08

## 2019-05-08 VITALS
HEART RATE: 84 BPM | DIASTOLIC BLOOD PRESSURE: 80 MMHG | OXYGEN SATURATION: 98 % | TEMPERATURE: 96.8 F | SYSTOLIC BLOOD PRESSURE: 110 MMHG | BODY MASS INDEX: 33.74 KG/M2 | RESPIRATION RATE: 18 BRPM | WEIGHT: 215 LBS | HEIGHT: 67 IN

## 2019-05-08 DIAGNOSIS — M48.02 FORAMINAL STENOSIS OF CERVICAL REGION: ICD-10-CM

## 2019-05-08 DIAGNOSIS — M48.02 CERVICAL STENOSIS OF SPINAL CANAL: Primary | ICD-10-CM

## 2019-05-08 DIAGNOSIS — M47.812 CERVICAL SPONDYLOSIS WITHOUT MYELOPATHY: ICD-10-CM

## 2019-05-08 DIAGNOSIS — M50.30 DDD (DEGENERATIVE DISC DISEASE), CERVICAL: ICD-10-CM

## 2019-05-08 PROCEDURE — 99213 OFFICE O/P EST LOW 20 MIN: CPT | Performed by: PHYSICIAN ASSISTANT

## 2019-05-08 RX ORDER — GABAPENTIN 100 MG/1
100 CAPSULE ORAL 4 TIMES DAILY
Qty: 120 CAPSULE | Refills: 5 | OUTPATIENT
Start: 2019-05-08 | End: 2019-10-28 | Stop reason: SDUPTHER

## 2019-10-27 RX ORDER — GABAPENTIN 100 MG/1
CAPSULE ORAL
Qty: 120 CAPSULE | Refills: 5 | Status: CANCELLED | OUTPATIENT
Start: 2019-10-27

## 2019-10-28 RX ORDER — GABAPENTIN 100 MG/1
100 CAPSULE ORAL 4 TIMES DAILY
Qty: 120 CAPSULE | Refills: 1 | OUTPATIENT
Start: 2019-10-28 | End: 2019-12-26

## 2019-10-28 NOTE — TELEPHONE ENCOUNTER
Received refill request for Gabapentin. Patient last seen 05/08/19. Jag report # [id number] scanned to media. Rx called in

## 2019-12-26 NOTE — TELEPHONE ENCOUNTER
Patient scheduled for follow up with Stephany 01/08/20 at 3 pm  Called into pharmacy left on voicemanadira Rm [phone]  09/03/2019 Gabapentin 100MG 1965 [address] [facility] [facility]  [phone]  [place] 1  10/01/2019 Gabapentin 100MG 1965 [address] [facility] 10-  [phone]  [facility] 1  10/28/2019 Gabapentin 100MG 1965 [address] [facility]-  [phone]  [place] 1 11/29/2019 Gabapentin 100MG 1965 120 [address] [facility] 10-  [phone]  [facility] 1

## 2019-12-27 RX ORDER — GABAPENTIN 100 MG/1
CAPSULE ORAL
Qty: 120 CAPSULE | Refills: 0 | OUTPATIENT
Start: 2019-12-27 | End: 2020-01-08 | Stop reason: SDUPTHER

## 2020-01-07 NOTE — PROGRESS NOTES
"Chief Complaint: \"My neck pain is still manageable.\"    History of Present Illness:   Patient: Mr. Charles Finney, 51 y.o. male originally referred by Dr. Best in consultation for intractable chronic neck and right shoulder pain. He reports a 3-4 year history of pain, which began without incident.  Today, patient reports stiffness through the neck and a dull ache in the shoulder blade, which he reports waxes and wanes. He was last seen on May 6, 2019, by Thomas Brar PA-C, for follow up evaluation and medication refill.  At the time of his last office visit he continued to report significant ongoing relief from previous cervical epidural steroid injection at C6-C7 by right interlaminar approach, performed on 10/29/2018.  He did report the pain was gradually recurring but remained manageable, today he continues to feel he continues at a manageable level.  He continues to take gabapentin 100 mg four times daily.  He does report more pain at night associated with trouble sleeping, I advised him to trial two tablets of gabapentin at night, and continue two tablets throughout the day, and determine if he feels more analgesic benefit. He denies adverse effects, and reports he continues to be pleased with the outcome of medication regimen. He returns today for follow up evaluation of his chronic pain. He reports no significant changes in his medical history since last office visit.  Pain description: constant pain with intermittent exacerbation, described as aching and burning sensation.   Radiation of pain: The pain radiates from the neck to the right shoulder and right hand.  Today is a good day, but generally the worst area of pain in the low neck area is more severe than the right shoulder and shoulder blade pain   Pain intensity today: 3/10 (shoulder), 1/10 (neck)  Average pain intensity last week: 5/10 (shoulder), 1/10 (neck)  Pain intensity ranges from: 1/10 to 5/10  Aggravating factors: Pain increases with " lateral flexion and rotation of the cervical spine   Alleviating factors: Pain decreases with analgesics, massager and ice.   Associated symptoms:   Patient denies pain, numbness or weakness in the upper extremities.  Patient denies any new bladder or bowel problems.   Patient denies difficulties with his balance.   Patient denies headaches.    Review of previous therapies and additional medical records:  Charles Finney has already failed the following measures, including:   Conservative measures: oral analgesics, topical analgesics, physical therapy, ice, heat and home exercises, NSAIDs   Interventional measures: 10/29/2018: cervical epidural steroid injection at C6-C7 by right interlaminar approach  Surgical measures: None  Charles Finney underwent neurosurgical  consultation with Dr. Best on 09/23/2016, and was found not to be a surgical candidate at this time, although at some point, he may become a candidate for C3-C6 ACDF.   Charles Finney has a history of hypertension and is engaged in treatment.  In terms of current analgesics, Charles Finney takes: gabapentin, and Tylenol and SalonPas  RODDY Report #[id number] consistent to medication reconciliation.    Global Pain Scale 10-11-  2018 11-26-  2018 05-08-  2019  01-08-  2020           Pain 12 4 6  9           Feelings 0 0 0  0           Clinical outcomes 19 0 4  8           Activities 18 0 0  7           GPS Total: 49 4 10  24             Review of diagnostic Studies: No New Diagnostics for review  MRI, cervical spine, without, 08/30/2016; The cord signal is normal. C3-C4: Facet hypertrophy, disc osteophyte complex, moderate central canal narrowing. C4-C5: Facet and uncovertebral joint hypertrophy. Disc osteophyte. Moderate to severe central canal and bilateral neural foraminal narrowing. C5-C6: Facet and uncovertebral joint hypertrophy. Disc osteophyte. Severe central canal and bilateral neural foraminal narrowing C6-C7: Facet and uncovertebral joint  hypertrophy. Mild central canal and bilateral neural foraminal narrowing. C7-T1: Facet hypertrophy no stenosis.    Review of Systems   Musculoskeletal: Positive for back pain, neck pain and neck stiffness.   All other systems reviewed and are negative.     Patient Active Problem List   Diagnosis   • Cervical stenosis of spinal canal   • Foraminal stenosis of cervical region   • Cervical spondylosis without myelopathy   • DDD (degenerative disc disease), cervical   • Essential hypertension   • Mild obesity   • Hypercholesterolemia   • Mild intermittent asthma without complication     Past Medical History:   Diagnosis Date   • Asthma    • Back problem    • Cervical disc disorder    • Chronic pain disorder    • Hypertension    • Joint pain    • Low back pain    • Lumbosacral disc disease    • Neck pain    • Umbilical hernia       Past Surgical History:   Procedure Laterality Date   • CYSTOSCOPY BLADDER STONE LITHOTRIPSY     • KIDNEY STONE SURGERY  01/01/2013    Lithotripsy   • KNEE CARTILAGE SURGERY  01/01/1991   • NASAL SEPTUM SURGERY  05/01/2016   • TONSILLECTOMY     • UMBILICAL HERNIA REPAIR  01/01/1999         Family History   Problem Relation Age of Onset   • Breast cancer Mother    • Diabetes Father    • Kidney failure Father          Social History     Socioeconomic History   • Marital status: Single     Spouse name: Not on file   • Number of children: Not on file   • Years of education: Not on file   • Highest education level: Not on file   Tobacco Use   • Smoking status: Never Smoker   • Smokeless tobacco: Never Used   Substance and Sexual Activity   • Alcohol use: Yes     Comment: Rarely   • Drug use: No        Current Outpatient Medications:   •  albuterol (PROVENTIL HFA;VENTOLIN HFA) 108 (90 BASE) MCG/ACT inhaler, Inhale 2 puffs every 4 (four) hours as needed for wheezing., Disp: , Rfl:   •  FLOVENT DISKUS 250 MCG/BLIST aerosol powder , , Disp: , Rfl:   •  fluticasone (FLONASE) 50 MCG/ACT nasal spray, 2  sprays into each nostril daily., Disp: , Rfl:   •  gabapentin (NEURONTIN) 100 MG capsule, TAKE 1 CAPSULE BY MOUTH 4 TIMES DAILY, Disp: 120 capsule, Rfl: 0  •  lisinopril (PRINIVIL,ZESTRIL) 20 MG tablet, Take 20 mg by mouth 2 (Two) Times a Day., Disp: , Rfl:   •  pravastatin (PRAVACHOL) 80 MG tablet, , Disp: , Rfl:   •  nortriptyline (PAMELOR) 10 MG capsule, Take 1 capsule by mouth 2 (Two) Times a Day., Disp: 60 capsule, Rfl: 5      Allergies   Allergen Reactions   • Other      Environmental allergies     • Penicillins          /90   Pulse 78   Temp 97.4 °F (36.3 °C) (Temporal)   Resp 16   Wt 97.1 kg (214 lb)   SpO2 96%   BMI 33.52 kg/m²      Physical Exam:  Constitutional: Patient is oriented to person, place, and time. Vital signs are normal. Patient appears well-developed and well-nourished.   HENT: Head: Normocephalic and atraumatic. Eyes: Conjunctivae and lids are normal. Pupils: Equal, round, reactive to light.   Neck: Trachea normal. Neck supple. No JVD present.   Pulmonary Respiratory effort: No increased work of breathing or signs of respiratory distress. Auscultation of lungs: Clear to auscultation.   Cardiovascular Auscultation of heart: Normal rate and rhythm, normal S1 and S2, no murmurs. Peripheral vascular exam: Normal. Carotid: right 2+, left 2+, no bruit on the right and no bruit on the left. Femoral: right 2+, left 2+, no bruit on the right and no bruit on the left. Posterior tibialis: right 2+ and left 2+. Dorsalis pedis: right 2+ and left 2+. No edema.   Abdomen: The abdomen was soft and nontender. Bowel sounds were normal.   Lymphatic: No cervical adenopathy  Musculoskeletal   Gait and station: Gait evaluation demonstrated a normal gait.   Cervical spine: Passive and active range of motion is almost full and without pain. Extension, flexion, lateral flexion, rotation of the cervical spine did not increase or reproduce pain. Cervical facet joint loading maneuvers are negative.    Muscles: Presence of active trigger points right levator scapulae    Shoulders: The range of motion of the glenohumeral joints is full and without pain. Rotator cuff strength is 5/5.   Thoracic spine: Thoracic facet joint loading maneuvers are negative.   Neurological:   Patient is alert and oriented to person, place, and time. Speech: speech is normal. Cortical function: Normal mental status.   Cranial nerves: Cranial nerves 2-12 intact.   Reflex Scores:  Right brachioradialis: 2+  Left brachioradialis: 2+  Right biceps: 2+  Left biceps: 2+  Right triceps: 2+  Left triceps: 2+  Right patellar: 3+  Left patellar: 3+  Right achilles: 2+  Left achilles: 2+  Motor strength: 5/5  Motor Tone: normal tone.   Involuntary movements: none.   Superficial/Primitive Reflexes: primitive reflexes were absent.   Right Pierre: absent  Left Pierre: absent  Right ankle clonus: absent  Left ankle clonus: absent   Spurling sign is negative. Lhermitte sign is negative. Negative long tract signs. Straight leg raising test is negative. Femoral stretch sign is negative.   Sensation: No sensory loss. Sensory exam: intact to light touch, intact pain and temperature sensation, intact vibration sensation and normal proprioception.   Coordination: Normal finger to nose and heel to shin. Normal balance and negative. Romberg's sign negative.   Skin and subcutaneous tissue: Skin is warm and intact. No rash noted. No cyanosis.   Psychiatric:   Judgment and insight: Normal.   Orientation to person, place and time: Normal.   Recent and remote memory: Intact.   Mood and affect: Normal.     ASSESSMENT:   1. Cervical stenosis of spinal canal    2. Foraminal stenosis of cervical region    3. Cervical spondylosis without myelopathy    4. DDD (degenerative disc disease), cervical    5. Mild obesity          PLAN/MEDICAL DECISION MAKING:  I had a lengthy conversation with Mr. Charles Finney regarding his chronic pain condition and potential therapeutic  options including risks, benefits, alternative therapies, to name a few. Patient has failed to obtain pain relief with conservative measures. I have reviewed all available patient's medical records as well as previous therapies as referenced above, and discussed the patient with Dr. Solis.  Therefore, I have proposed the following plan:  1. Interventional pain management measures: None indicated at this time. Patient could be scheduled for repeat cervical epidural steroid injection at C6-C7 by right interlaminar approach. We may repeat another epidural depending on patient's outcome. Patient will follow-up with Dr. Best thereafter for possible ACDF.  2. Follow up in 6 months or sooner if needed  3. Long-term rehabilitation efforts:  A. The patient does not have a history of falls. I did complete a risk assessment for falls.   B. Patient will start a comprehensive physical therapy program for upper body strengthening/posture correction, gait and balance training, neurodynamics, E-STIM, myofascial release, cupping and dry needling  C. Start an exercise program such as daily walks for fitness  4. Pharmacological measures:  A. Continue with gabapentin 100 mg four times per day (he will trial 200 mg QHS)  B. Continue with nortriptyline 10 mg 1-2 tablets at bedtime as needed for insomnia  5. The patient has been instructed to contact my office with any questions or difficulties. The patient understands the plan and agrees to proceed accordingly.       Patient Care Team:  Caridad Kaba MD as PCP - General (Family Medicine)  Rakesh Best MD as Consulting Physician (Neurosurgery)  John Del Castillo MD as Consulting Physician (Cardiology)  Latrell Solis MD as Consulting Physician (Anesthesiology)  Thomas Brar PA-C as Physician Assistant (Physician Assistant)       New Medications Ordered This Visit   Medications   • nortriptyline (PAMELOR) 10 MG capsule     Sig: Take 1 capsule by mouth 2 (Two) Times a  Day.     Dispense:  60 capsule     Refill:  5           Future Appointments   Date Time Provider Department Center   7/8/2020  3:00 PM Stephany Stevenson APRN [facility] None         MARNIE Jacobson

## 2020-01-08 ENCOUNTER — OFFICE VISIT (OUTPATIENT)
Dept: PAIN MEDICINE | Facility: CLINIC | Age: 55
End: 2020-01-08

## 2020-01-08 VITALS
HEART RATE: 78 BPM | WEIGHT: 214 LBS | SYSTOLIC BLOOD PRESSURE: 132 MMHG | RESPIRATION RATE: 16 BRPM | OXYGEN SATURATION: 96 % | BODY MASS INDEX: 33.52 KG/M2 | TEMPERATURE: 97.4 F | DIASTOLIC BLOOD PRESSURE: 90 MMHG

## 2020-01-08 DIAGNOSIS — M50.30 DDD (DEGENERATIVE DISC DISEASE), CERVICAL: ICD-10-CM

## 2020-01-08 DIAGNOSIS — M48.02 CERVICAL STENOSIS OF SPINAL CANAL: ICD-10-CM

## 2020-01-08 DIAGNOSIS — E66.9 MILD OBESITY: ICD-10-CM

## 2020-01-08 DIAGNOSIS — M47.812 CERVICAL SPONDYLOSIS WITHOUT MYELOPATHY: ICD-10-CM

## 2020-01-08 DIAGNOSIS — M48.02 FORAMINAL STENOSIS OF CERVICAL REGION: ICD-10-CM

## 2020-01-08 PROCEDURE — 99213 OFFICE O/P EST LOW 20 MIN: CPT | Performed by: NURSE PRACTITIONER

## 2020-01-08 RX ORDER — GABAPENTIN 100 MG/1
100 CAPSULE ORAL 4 TIMES DAILY
Qty: 360 CAPSULE | Refills: 1 | OUTPATIENT
Start: 2020-01-08 | End: 2020-07-08 | Stop reason: SDUPTHER

## 2020-01-08 RX ORDER — NORTRIPTYLINE HYDROCHLORIDE 10 MG/1
10 CAPSULE ORAL 2 TIMES DAILY
Qty: 60 CAPSULE | Refills: 5 | Status: SHIPPED | OUTPATIENT
Start: 2020-01-08 | End: 2020-07-08 | Stop reason: SDUPTHER

## 2020-07-07 NOTE — PROGRESS NOTES
"This consultation was provided with the use of interactive audio through telephone that permits real time communication with the patient, as patient is unable to attend an in-office appointment due to the COVID-19 crisis. Consent for assessment and treatment was provided by the patient.    You have chosen to receive care through a telephone visit today. Do you consent to use a telephone visit for your medical care today?   Yes     Chief Complaint: \"My neck pain has started to bother me more frequently.\"    History of Present Illness:   Patient: Mr. Charles Finney, 51 y.o. male originally referred by Dr. Best in consultation for intractable chronic neck and right shoulder pain. He reports a 3-4 year history of pain, which began without incident.  Today, patient reports stiffness through the neck and a dull ache in the shoulder blade, which he reports waxes and wanes, but more recently he reports his neck pain has became more bothersome to him.  He was last seen on January 8, 2020, by me, for follow up evaluation and medication refill.  At the time of his last office visit he continued to report significant ongoing relief from previous cervical epidural steroid injection at C6-C7 by right interlaminar approach, performed on 10/29/2018.  He continues to take gabapentin 100 mg four times daily.  He does report more pain at night associated with trouble sleeping, and has been taking two tablets of gabapentin at night per my instruction from last office visit, and continue two tablets throughout the day. He denies adverse effects, and reports he continues to be pleased with the outcome of medication regimen. He requests consultation today for follow up evaluation of his chronic pain. He reports no significant changes in his medical history since last office visit.  Pain description: constant pain with intermittent exacerbation, described as aching and burning sensation.   Radiation of pain: The pain radiates from the " neck to the right shoulder and right hand.    Pain intensity today: 4/10   Average pain intensity last week: 5/10   Pain intensity ranges from: 2-3/10 to 6/10  Aggravating factors: Pain increases with lateral flexion and rotation of the cervical spine   Alleviating factors: Pain decreases with analgesics, massager and ice.   Associated symptoms:   Patient denies pain, numbness or weakness in the upper extremities.  Patient denies any new bladder or bowel problems.   Patient denies difficulties with his balance.   Patient denies headaches.    Review of previous therapies and additional medical records:  Charles Finney has already failed the following measures, including:   Conservative measures: oral analgesics, topical analgesics, physical therapy, ice, heat and home exercises, NSAIDs   Interventional measures: 10/29/2018: cervical epidural steroid injection at C6-C7 by right interlaminar approach  Surgical measures: No history of cervical spine surgery.  Charles Finney underwent neurosurgical  consultation with Dr. Best on 09/23/2016, and was found not to be a surgical candidate at this time, although at some point, he may become a candidate for C3-C6 ACDF.   Charles Finney has a history of hypertension and is engaged in treatment.  In terms of current analgesics, Charles Finney takes: gabapentin, and Tylenol and SalonPas  RODDY Report #[id number] consistent to medication reconciliation.    Global Pain Scale 10-11-  2018 11-26-  2018 05-08-  2019  01-08-  2020           Pain 12 4 6  9           Feelings 0 0 0  0           Clinical outcomes 19 0 4  8           Activities 18 0 0  7           GPS Total: 49 4 10  24             Review of diagnostic Studies: No New Diagnostics for review  MRI, cervical spine, without, 08/30/2016; The cord signal is normal. C3-C4: Facet hypertrophy, disc osteophyte complex, moderate central canal narrowing. C4-C5: Facet and uncovertebral joint hypertrophy. Disc osteophyte. Moderate to  severe central canal and bilateral neural foraminal narrowing. C5-C6: Facet and uncovertebral joint hypertrophy. Disc osteophyte. Severe central canal and bilateral neural foraminal narrowing C6-C7: Facet and uncovertebral joint hypertrophy. Mild central canal and bilateral neural foraminal narrowing. C7-T1: Facet hypertrophy no stenosis.    Review of Systems   Musculoskeletal: Positive for back pain, neck pain and neck stiffness.   All other systems reviewed and are negative.     Patient Active Problem List   Diagnosis   • Cervical stenosis of spinal canal   • Foraminal stenosis of cervical region   • Cervical spondylosis without myelopathy   • DDD (degenerative disc disease), cervical   • Essential hypertension   • Mild obesity   • Hypercholesterolemia   • Mild intermittent asthma without complication     Past Medical History:   Diagnosis Date   • Asthma    • Back problem    • Cervical disc disorder    • Chronic pain disorder    • Hypertension    • Joint pain    • Low back pain    • Lumbosacral disc disease    • Neck pain    • Umbilical hernia       Past Surgical History:   Procedure Laterality Date   • CYSTOSCOPY BLADDER STONE LITHOTRIPSY     • KIDNEY STONE SURGERY  01/01/2013    Lithotripsy   • KNEE CARTILAGE SURGERY  01/01/1991   • NASAL SEPTUM SURGERY  05/01/2016   • TONSILLECTOMY     • UMBILICAL HERNIA REPAIR  01/01/1999         Family History   Problem Relation Age of Onset   • Breast cancer Mother    • Diabetes Father    • Kidney failure Father          Social History     Socioeconomic History   • Marital status: Single     Spouse name: Not on file   • Number of children: Not on file   • Years of education: Not on file   • Highest education level: Not on file   Tobacco Use   • Smoking status: Never Smoker   • Smokeless tobacco: Never Used   Substance and Sexual Activity   • Alcohol use: Yes     Comment: Rarely   • Drug use: No        Current Outpatient Medications:   •  albuterol (PROVENTIL HFA;VENTOLIN  HFA) 108 (90 BASE) MCG/ACT inhaler, Inhale 2 puffs every 4 (four) hours as needed for wheezing., Disp: , Rfl:   •  FLOVENT DISKUS 250 MCG/BLIST aerosol powder , , Disp: , Rfl:   •  fluticasone (FLONASE) 50 MCG/ACT nasal spray, 2 sprays into each nostril daily., Disp: , Rfl:   •  gabapentin (NEURONTIN) 100 MG capsule, Take 1 capsule by mouth 4 (Four) Times a Day., Disp: 360 capsule, Rfl: 1  •  lisinopril (PRINIVIL,ZESTRIL) 20 MG tablet, Take 20 mg by mouth 2 (Two) Times a Day., Disp: , Rfl:   •  nortriptyline (PAMELOR) 10 MG capsule, Take 1 capsule by mouth 2 (Two) Times a Day., Disp: 60 capsule, Rfl: 5  •  pravastatin (PRAVACHOL) 80 MG tablet, , Disp: , Rfl:       Allergies   Allergen Reactions   • Other      Environmental allergies     • Penicillins          There were no vitals taken for this visit.     Due to the constraints of the telemedicine format, no physical exam was performed     Physical Exam: (Previous PE from last office visit)  Constitutional: Patient is oriented to person, place, and time. Vital signs are normal. Patient appears well-developed and well-nourished.   HENT: Head: Normocephalic and atraumatic. Eyes: Conjunctivae and lids are normal. Pupils: Equal, round, reactive to light.   Neck: Trachea normal. Neck supple. No JVD present.   Pulmonary Respiratory effort: No increased work of breathing or signs of respiratory distress. Auscultation of lungs: Clear to auscultation.   Cardiovascular Auscultation of heart: Normal rate and rhythm, normal S1 and S2, no murmurs. Peripheral vascular exam: Normal. Carotid: right 2+, left 2+, no bruit on the right and no bruit on the left. Femoral: right 2+, left 2+, no bruit on the right and no bruit on the left. Posterior tibialis: right 2+ and left 2+. Dorsalis pedis: right 2+ and left 2+. No edema.   Abdomen: The abdomen was soft and nontender. Bowel sounds were normal.   Lymphatic: No cervical adenopathy  Musculoskeletal   Gait and station: Gait evaluation  demonstrated a normal gait.   Cervical spine: Passive and active range of motion is almost full and without pain. Extension, flexion, lateral flexion, rotation of the cervical spine did not increase or reproduce pain. Cervical facet joint loading maneuvers are negative.   Muscles: Presence of active trigger points right levator scapulae    Shoulders: The range of motion of the glenohumeral joints is full and without pain. Rotator cuff strength is 5/5.   Thoracic spine: Thoracic facet joint loading maneuvers are negative.   Neurological:   Patient is alert and oriented to person, place, and time. Speech: speech is normal. Cortical function: Normal mental status.   Cranial nerves: Cranial nerves 2-12 intact.   Reflex Scores:  Right brachioradialis: 2+  Left brachioradialis: 2+  Right biceps: 2+  Left biceps: 2+  Right triceps: 2+  Left triceps: 2+  Right patellar: 3+  Left patellar: 3+  Right achilles: 2+  Left achilles: 2+  Motor strength: 5/5  Motor Tone: normal tone.   Involuntary movements: none.   Superficial/Primitive Reflexes: primitive reflexes were absent.   Right Pierre: absent  Left Pierre: absent  Right ankle clonus: absent  Left ankle clonus: absent   Spurling sign is negative. Lhermitte sign is negative. Negative long tract signs. Straight leg raising test is negative. Femoral stretch sign is negative.   Sensation: No sensory loss. Sensory exam: intact to light touch, intact pain and temperature sensation, intact vibration sensation and normal proprioception.   Coordination: Normal finger to nose and heel to shin. Normal balance and negative. Romberg's sign negative.   Skin and subcutaneous tissue: Skin is warm and intact. No rash noted. No cyanosis.   Psychiatric:   Judgment and insight: Normal.   Orientation to person, place and time: Normal.   Recent and remote memory: Intact.   Mood and affect: Normal.     ASSESSMENT:   1. Foraminal stenosis of cervical region    2. Cervical stenosis of spinal  canal    3. Cervical spondylosis without myelopathy    4. DDD (degenerative disc disease), cervical    5. Mild obesity          PLAN/MEDICAL DECISION MAKING:  I had a lengthy conversation with Mr. Charles Finney regarding his chronic pain condition and potential therapeutic options including risks, benefits, alternative therapies, to name a few. Patient has failed to obtain pain relief with conservative measures. I have reviewed all available patient's medical records as well as previous therapies as referenced above, and discussed the patient with Dr. Solis.  Therefore, I have proposed the following plan:  1. Interventional pain management measures: Patient will be scheduled for repeat cervical epidural steroid injection at C6-C7 by right interlaminar approach. We may repeat another epidural depending on patient's outcome. Patient will follow-up with Dr. Best thereafter for possible ACDF.  2. Follow up in 6 months or sooner if needed  3. Long-term rehabilitation efforts:  A. The patient does not have a history of falls. I did complete a risk assessment for falls.   B. Patient will start a comprehensive physical therapy program for upper body strengthening/posture correction, gait and balance training, neurodynamics, E-STIM, myofascial release, cupping and dry needling  C. Start an exercise program such as daily walks for fitness  4. Pharmacological measures:  A. Continue with gabapentin 100 mg two times daily, 200 mg QHS. #120, 5 refills.  B. Continue with nortriptyline 10 mg 1-2 tablets at bedtime as needed for insomnia  5. The patient has been instructed to contact my office with any questions or difficulties. The patient understands the plan and agrees to proceed accordingly.    Patient has been made aware that patient will continue to have access to telephone call, tele-health or e-visit, or in office visit if an emergent or urgent issue arises.     This visit was performed via Telehealth. Patient was  advised to call us back or contact a primary care provider, Urgent Care or the Emergency Department if symptoms worsen or treatment provided does not resolve symptoms. Patient verbalizes understanding of medication dosage, comfort measures, instructions for treatment, and follow-up.    This visit has been scheduled as a phone visit to comply with patient safety concerns in accordance with CDC recommendations. Total time of discussion was 17 minutes.         Patient Care Team:  Caridad Kaba MD as PCP - General (Family Medicine)  Rakesh Best MD as Consulting Physician (Neurosurgery)  John Del Castillo MD as Consulting Physician (Cardiology)  Latrell Solis MD as Consulting Physician (Anesthesiology)  Thomas Brar PA-C as Physician Assistant (Physician Assistant)       No orders of the defined types were placed in this encounter.          No future appointments.      MARNIE Jacobson

## 2020-07-08 ENCOUNTER — OFFICE VISIT (OUTPATIENT)
Dept: PAIN MEDICINE | Facility: CLINIC | Age: 55
End: 2020-07-08

## 2020-07-08 DIAGNOSIS — M50.30 DDD (DEGENERATIVE DISC DISEASE), CERVICAL: ICD-10-CM

## 2020-07-08 DIAGNOSIS — M48.02 CERVICAL STENOSIS OF SPINAL CANAL: ICD-10-CM

## 2020-07-08 DIAGNOSIS — M48.02 FORAMINAL STENOSIS OF CERVICAL REGION: Primary | ICD-10-CM

## 2020-07-08 DIAGNOSIS — M47.812 CERVICAL SPONDYLOSIS WITHOUT MYELOPATHY: ICD-10-CM

## 2020-07-08 DIAGNOSIS — E66.9 MILD OBESITY: ICD-10-CM

## 2020-07-08 DIAGNOSIS — M48.02 FORAMINAL STENOSIS OF CERVICAL REGION: ICD-10-CM

## 2020-07-08 PROCEDURE — 99213 OFFICE O/P EST LOW 20 MIN: CPT | Performed by: NURSE PRACTITIONER

## 2020-07-08 RX ORDER — NORTRIPTYLINE HYDROCHLORIDE 10 MG/1
10 CAPSULE ORAL 2 TIMES DAILY
Qty: 60 CAPSULE | Refills: 5 | Status: SHIPPED | OUTPATIENT
Start: 2020-07-08 | End: 2022-05-26

## 2020-07-08 RX ORDER — GABAPENTIN 100 MG/1
100 CAPSULE ORAL 4 TIMES DAILY
Qty: 360 CAPSULE | Refills: 1 | Status: SHIPPED | OUTPATIENT
Start: 2020-07-08 | End: 2020-09-15

## 2020-07-24 ENCOUNTER — APPOINTMENT (OUTPATIENT)
Dept: PREADMISSION TESTING | Facility: HOSPITAL | Age: 55
End: 2020-07-24

## 2020-07-24 DIAGNOSIS — M48.02 FORAMINAL STENOSIS OF CERVICAL REGION: Primary | ICD-10-CM

## 2020-07-24 PROCEDURE — C9803 HOPD COVID-19 SPEC COLLECT: HCPCS

## 2020-07-24 PROCEDURE — U0004 COV-19 TEST NON-CDC HGH THRU: HCPCS

## 2020-07-24 PROCEDURE — U0002 COVID-19 LAB TEST NON-CDC: HCPCS

## 2020-07-25 LAB
REF LAB TEST METHOD: NORMAL
SARS-COV-2 RNA RESP QL NAA+PROBE: NOT DETECTED

## 2020-07-27 ENCOUNTER — OUTSIDE FACILITY SERVICE (OUTPATIENT)
Dept: PAIN MEDICINE | Facility: CLINIC | Age: 55
End: 2020-07-27

## 2020-07-27 PROCEDURE — 99152 MOD SED SAME PHYS/QHP 5/>YRS: CPT | Performed by: ANESTHESIOLOGY

## 2020-07-27 PROCEDURE — 62321 NJX INTERLAMINAR CRV/THRC: CPT | Performed by: ANESTHESIOLOGY

## 2020-07-28 ENCOUNTER — TELEPHONE (OUTPATIENT)
Dept: PAIN MEDICINE | Facility: CLINIC | Age: 55
End: 2020-07-28

## 2020-08-27 ENCOUNTER — OFFICE VISIT (OUTPATIENT)
Dept: PAIN MEDICINE | Facility: CLINIC | Age: 55
End: 2020-08-27

## 2020-08-27 VITALS
TEMPERATURE: 98.2 F | BODY MASS INDEX: 33.34 KG/M2 | OXYGEN SATURATION: 95 % | HEART RATE: 95 BPM | WEIGHT: 212.4 LBS | RESPIRATION RATE: 16 BRPM | DIASTOLIC BLOOD PRESSURE: 102 MMHG | HEIGHT: 67 IN | SYSTOLIC BLOOD PRESSURE: 150 MMHG

## 2020-08-27 DIAGNOSIS — E66.9 MILD OBESITY: ICD-10-CM

## 2020-08-27 DIAGNOSIS — M48.02 CERVICAL STENOSIS OF SPINAL CANAL: ICD-10-CM

## 2020-08-27 DIAGNOSIS — M48.02 FORAMINAL STENOSIS OF CERVICAL REGION: ICD-10-CM

## 2020-08-27 DIAGNOSIS — M50.30 DDD (DEGENERATIVE DISC DISEASE), CERVICAL: ICD-10-CM

## 2020-08-27 DIAGNOSIS — M47.812 CERVICAL SPONDYLOSIS WITHOUT MYELOPATHY: ICD-10-CM

## 2020-08-27 PROCEDURE — 99213 OFFICE O/P EST LOW 20 MIN: CPT | Performed by: NURSE PRACTITIONER

## 2020-08-27 NOTE — PROGRESS NOTES
"Chief Complaint: \"My neck pain has started to bother me more frequently. I now have numbness in my hand.\"    History of Present Illness:   Patient: Mr. Charles Finney, 51 y.o. male originally referred by Dr. Best in consultation for intractable chronic neck and right shoulder pain. He reports a 3-4 year history of pain, which began without incident.  Today, patient reports stiffness through the neck and a dull ache in the shoulder blade, which he reports waxes and wanes, but more recently he reports his neck pain has became more bothersome to him.  He was last seen on July 27, 2020, when he underwent cervical epidural steroid injection, from which he reports experiencing complete pain relief for one day that appears to be from the anesthetics. The pain recurred the next day.  He appears to have significant progression of his symptoms, with complaints of pain radiating down his right upper extremity into his right hand with constant numbness to the first 3 digits.  His symptoms are persistent, and more constant than before.  He is participating in physical therapy as medically advised.   He continues to take gabapentin 100 mg two times daily, and 200 mg QHS. He denies adverse effects.  He returns today for postprocedure follow-up and evaluation.  Pain description: constant pain with intermittent exacerbation, described as aching and burning sensation.   Radiation of pain: The pain radiates from the neck to the right shoulder, right arm, and right hand with constant numbness to the first three digits of his right hand.  He denies left upper extremity symptoms.  Pain intensity today: 4/10   Average pain intensity last week: 5/10   Pain intensity ranges from: 3/10 to 7/10  Aggravating factors: Pain increases with extension, flexion and rotation of the cervical spine, and driving   Alleviating factors: Pain decreases with analgesics, hot shower, massager, and ice.   Associated symptoms:   Patient reports pain, numbness " and weakness in the right upper extremity. He denies left upper extremity symptoms  Patient denies any new bladder or bowel problems.   Patient denies difficulties with his balance.   Patient denies headaches.    Review of previous therapies and additional medical records:  Charles Finney has already failed the following measures, including:   Conservative measures: oral analgesics, topical analgesics, physical therapy, ice, heat and home exercises, NSAIDs   Interventional measures: 10/29/2018: cervical epidural steroid injection at C6-C7 by right interlaminar approach  07/27/2020: SERGIO  Surgical measures: No history of cervical spine surgery.  Charles Finney underwent neurosurgical  consultation with Dr. Best on 09/23/2016, and was found not to be a surgical candidate at this time, although at some point, he may become a candidate for C3-C6 ACDF.   Charles Finney has a history of hypertension and is engaged in treatment.  In terms of current analgesics, Charles Finney takes: gabapentin, and Tylenol and SalonPas  RODDY Report #[id number] consistent to medication reconciliation.    Global Pain Scale 10-11-  2018 11-26-  2018 05-08-  2019  01-08-  2020  08-27  2020         Pain 12 4 6  9  12         Feelings 0 0 0  0  0         Clinical outcomes 19 0 4  8  12         Activities 18 0 0  7  6         GPS Total: 49 4 10  24  30           Review of diagnostic Studies: No New Diagnostics for review  MRI, cervical spine, without, 08/30/2016; The cord signal is normal. C3-C4: Facet hypertrophy, disc osteophyte complex, moderate central canal narrowing. C4-C5: Facet and uncovertebral joint hypertrophy. Disc osteophyte. Moderate to severe central canal and bilateral neural foraminal narrowing. C5-C6: Facet and uncovertebral joint hypertrophy. Disc osteophyte. Severe central canal and bilateral neural foraminal narrowing C6-C7: Facet and uncovertebral joint hypertrophy. Mild central canal and bilateral neural foraminal  narrowing. C7-T1: Facet hypertrophy no stenosis.    Review of Systems   Musculoskeletal: Positive for back pain, neck pain and neck stiffness.   Neurological: Positive for numbness.   Psychiatric/Behavioral: Positive for sleep disturbance.   All other systems reviewed and are negative.     Patient Active Problem List   Diagnosis   • Cervical stenosis of spinal canal   • Foraminal stenosis of cervical region   • Cervical spondylosis without myelopathy   • DDD (degenerative disc disease), cervical   • Essential hypertension   • Mild obesity   • Hypercholesterolemia   • Mild intermittent asthma without complication     Past Medical History:   Diagnosis Date   • Asthma    • Back problem    • Cervical disc disorder    • Chronic pain disorder    • Hypertension    • Joint pain    • Low back pain    • Lumbosacral disc disease    • Neck pain    • Umbilical hernia       Past Surgical History:   Procedure Laterality Date   • CYSTOSCOPY BLADDER STONE LITHOTRIPSY     • KIDNEY STONE SURGERY  01/01/2013    Lithotripsy   • KNEE CARTILAGE SURGERY  01/01/1991   • NASAL SEPTUM SURGERY  05/01/2016   • TONSILLECTOMY     • UMBILICAL HERNIA REPAIR  01/01/1999         Family History   Problem Relation Age of Onset   • Breast cancer Mother    • Diabetes Father    • Kidney failure Father          Social History     Socioeconomic History   • Marital status: Single     Spouse name: Not on file   • Number of children: Not on file   • Years of education: Not on file   • Highest education level: Not on file   Tobacco Use   • Smoking status: Never Smoker   • Smokeless tobacco: Never Used   Substance and Sexual Activity   • Alcohol use: Yes     Comment: Rarely   • Drug use: No        Current Outpatient Medications:   •  albuterol (PROVENTIL HFA;VENTOLIN HFA) 108 (90 BASE) MCG/ACT inhaler, Inhale 2 puffs every 4 (four) hours as needed for wheezing., Disp: , Rfl:   •  FLOVENT DISKUS 250 MCG/BLIST aerosol powder , , Disp: , Rfl:   •  fluticasone  "(FLONASE) 50 MCG/ACT nasal spray, 2 sprays into each nostril daily., Disp: , Rfl:   •  gabapentin (NEURONTIN) 100 MG capsule, Take 1 capsule by mouth 4 (Four) Times a Day. 2 tablets at night., Disp: 360 capsule, Rfl: 1  •  lisinopril (PRINIVIL,ZESTRIL) 20 MG tablet, Take 20 mg by mouth 2 (Two) Times a Day., Disp: , Rfl:   •  nortriptyline (PAMELOR) 10 MG capsule, Take 1 capsule by mouth 2 (Two) Times a Day., Disp: 60 capsule, Rfl: 5  •  pravastatin (PRAVACHOL) 80 MG tablet, , Disp: , Rfl:       Allergies   Allergen Reactions   • Other      Environmental allergies     • Penicillins          BP (!) 150/102   Pulse 95   Temp 98.2 °F (36.8 °C) (Infrared)   Resp 16   Ht 170.2 cm (67\")   Wt 96.3 kg (212 lb 6.4 oz)   SpO2 95%   BMI 33.27 kg/m²        Physical Exam:   Constitutional: Patient is oriented to person, place, and time. Vital signs are normal. Patient appears well-developed and well-nourished.   HENT: Head: Normocephalic and atraumatic. Eyes: Conjunctivae and lids are normal. Pupils: Equal, round, reactive to light.   Neck: Trachea normal. Neck supple. No JVD present.   Pulmonary Respiratory effort: No increased work of breathing or signs of respiratory distress. Auscultation of lungs: Clear to auscultation.   Cardiovascular Auscultation of heart: Normal rate and rhythm, normal S1 and S2, no murmurs. Peripheral vascular exam: Normal. Carotid: right 2+, left 2+, no bruit on the right and no bruit on the left. Femoral: right 2+, left 2+, no bruit on the right and no bruit on the left. Posterior tibialis: right 2+ and left 2+. Dorsalis pedis: right 2+ and left 2+. No edema.   Abdomen: The abdomen was soft and nontender. Bowel sounds were normal.   Lymphatic: No cervical adenopathy  Musculoskeletal   Gait and station: Gait evaluation demonstrated a normal gait.   Cervical spine: Passive and active range of motion is limited secondary to pain. Extension, flexion, lateral flexion, rotation of the cervical spine " increased and reproduced pain. Cervical facet joint loading maneuvers are negative.   Muscles: Presence of active trigger points right levator scapulae, right rhomboids    Shoulders: The range of motion of the glenohumeral joints is full and without pain. Rotator cuff strength is 5/5.   Thoracic spine: Thoracic facet joint loading maneuvers are negative.   Neurological:   Patient is alert and oriented to person, place, and time. Speech: speech is normal. Cortical function: Normal mental status.   Cranial nerves: Cranial nerves 2-12 intact.   Reflex Scores:  Right brachioradialis: 1+  Left brachioradialis: 1+  Right biceps: 1+  Left biceps: 1+  Right triceps: 1+  Left triceps: 1+  Motor strength: 5/5, except for 4/5 right bicep  Motor Tone: normal tone.   Involuntary movements: none.   Superficial/Primitive Reflexes: primitive reflexes were absent.   Right Pierre: absent  Left Pierre: absent  Right ankle clonus: absent  Left ankle clonus: absent   Spurling sign is positive on the right. Lhermitte sign is negative. Neck tornado test is positive. Negative long tract signs. Straight leg raising test is negative. Femoral stretch sign is negative.   Sensation: No sensory loss. Sensory exam: intact to light touch, intact pain and temperature sensation, intact vibration sensation and normal proprioception.   Coordination: Normal finger to nose and heel to shin. Normal balance and negative. Romberg's sign negative.   Skin and subcutaneous tissue: Skin is warm and intact. No rash noted. No cyanosis.   Psychiatric: Judgment and insight: Normal.   Orientation to person, place and time: Normal.   Recent and remote memory: Intact.   Mood and affect: Normal.     ASSESSMENT:   1. Foraminal stenosis of cervical region    2. DDD (degenerative disc disease), cervical    3. Cervical spondylosis without myelopathy    4. Cervical stenosis of spinal canal    5. Mild obesity          PLAN/MEDICAL DECISION MAKING:  I had a lengthy  conversation with Mr. Charles Finney regarding his chronic pain condition and potential therapeutic options including risks, benefits, alternative therapies, to name a few. Patient has failed to obtain pain relief with conservative measures. He appears to have significant progression of his symptoms, with complaints of pain radiating down his right upper extremity into his right hand with constant numbness to the first 3 digits.  His symptoms are persistent, and more constant than before.  At this time I have recommended the patient undergo neurosurgical consultation, he has seen Dr. Best in the past.  I have increased his gabapentin to 300 mg 3 times daily, and he will take ibuprofen in the meantime as well.  He will continue physical therapy although, this has not provided him with much symptom relief.  I see no apparent signs of myelopathy, in addition I have discussed with him signs and symptoms of myelopathy.  I have reviewed all available patient's medical records as well as previous therapies as referenced above, and discussed the patient with Dr. Solis.  Therefore, I have proposed the following plan:  1. Interventional pain management measures: None indicated at this time.  Patient will follow-up with Dr. Best.  2. Referral to Neurosurgery (Has seen Dr. Best in the past)  3. Long-term rehabilitation efforts:  A. The patient does not have a history of falls. I did complete a risk assessment for falls.   B. Patient will continue a comprehensive physical therapy program for upper body strengthening/posture correction, gait and balance training, neurodynamics, E-STIM, myofascial release, cupping and dry needling  C. Start an exercise program such as daily walks for fitness  4. Pharmacological measures:  A. Increase gabapentin 300 mg three times daily, he will use his current prescription to increase, he will inform me of his progress in 2 weeks.  B. Continue  nortriptyline 10 mg 1-2 tablets at bedtime  as needed for insomnia  5. The patient has been instructed to contact my office with any questions or difficulties. The patient understands the plan and agrees to proceed accordingly.         Patient Care Team:  Caridad Kaba MD as PCP - General (Family Medicine)  Rakesh Best MD as Consulting Physician (Neurosurgery)  John Del Castillo MD as Consulting Physician (Cardiology)  Latrell Solis MD as Consulting Physician (Anesthesiology)  Thomas Brar PA-C as Physician Assistant (Physician Assistant)       No orders of the defined types were placed in this encounter.          No future appointments.      Stephany Stevenson, APRN

## 2020-09-15 ENCOUNTER — TELEPHONE (OUTPATIENT)
Dept: PAIN MEDICINE | Facility: CLINIC | Age: 55
End: 2020-09-15

## 2020-09-15 DIAGNOSIS — M48.02 FORAMINAL STENOSIS OF CERVICAL REGION: Primary | ICD-10-CM

## 2020-09-15 RX ORDER — GABAPENTIN 300 MG/1
300 CAPSULE ORAL 3 TIMES DAILY
Qty: 90 CAPSULE | Refills: 3 | Status: SHIPPED | OUTPATIENT
Start: 2020-09-15 | End: 2021-01-19

## 2020-09-15 NOTE — TELEPHONE ENCOUNTER
Returned patient's phone call, he was calling to update me on the increase in his gabapentin as we discussed at his last office visit with me on August 27, 2020..  I increased him from 100 mg 3 times daily to 300 mg 3 times daily, he reports no side effects, and does feel increased analgesic benefit from increase in dosage.  I will send in new prescription.  Voices no other concerns at this time.

## 2020-09-23 ENCOUNTER — OFFICE VISIT (OUTPATIENT)
Dept: NEUROSURGERY | Facility: CLINIC | Age: 55
End: 2020-09-23

## 2020-09-23 VITALS
HEIGHT: 66 IN | WEIGHT: 211 LBS | SYSTOLIC BLOOD PRESSURE: 140 MMHG | BODY MASS INDEX: 33.91 KG/M2 | DIASTOLIC BLOOD PRESSURE: 90 MMHG | TEMPERATURE: 97.3 F

## 2020-09-23 DIAGNOSIS — G89.29 CHRONIC NECK PAIN: Primary | ICD-10-CM

## 2020-09-23 DIAGNOSIS — M79.601 RIGHT ARM PAIN: ICD-10-CM

## 2020-09-23 DIAGNOSIS — M54.2 CHRONIC NECK PAIN: Primary | ICD-10-CM

## 2020-09-23 PROCEDURE — 99214 OFFICE O/P EST MOD 30 MIN: CPT | Performed by: NEUROLOGICAL SURGERY

## 2020-09-23 RX ORDER — ASPIRIN 325 MG
81 TABLET ORAL DAILY
COMMUNITY
Start: 2020-09-17

## 2020-09-23 RX ORDER — LOSARTAN POTASSIUM 50 MG/1
50 TABLET ORAL DAILY
COMMUNITY
Start: 2020-09-17 | End: 2022-11-29

## 2020-09-23 NOTE — PROGRESS NOTES
Subjective     Chief Complaint: Neck and right arm pain    Patient ID: Charles Finney is a 54 y.o. male seen for consultation today at the request of  MARNIE Jacobson    Neck Pain   This is a chronic problem. The current episode started more than 1 year ago. The problem occurs constantly. The problem has been gradually worsening. The pain is associated with nothing. The pain is present in the right side. The quality of the pain is described as aching. The pain is moderate. The symptoms are aggravated by bending and position. Associated symptoms include numbness, tingling and weakness. He has tried home exercises and NSAIDs for the symptoms. The treatment provided no relief.       This is a 54-year-old man who presents to my clinic with a multiyear history of progressive right-sided neck, arm, and shoulder pain.  His symptoms have now progressed to involve radiating pain and numbness into his thumb and first digit of the right hand.  He has tried injections which were not helpful.  He has not tried physical therapy, however he has been doing home exercises which have not alleviated his pain.    The following portions of the patient's history were reviewed and updated as appropriate: allergies, current medications, past family history, past medical history, past social history, past surgical history and problem list.    Family history:   Family History   Problem Relation Age of Onset   • Breast cancer Mother    • Diabetes Father    • Kidney failure Father        Social history:   Social History     Socioeconomic History   • Marital status: Single     Spouse name: Not on file   • Number of children: Not on file   • Years of education: Not on file   • Highest education level: Not on file   Tobacco Use   • Smoking status: Never Smoker   • Smokeless tobacco: Never Used   Substance and Sexual Activity   • Alcohol use: Yes     Comment: Rarely   • Drug use: No       Review of Systems   Musculoskeletal: Positive for neck  "pain.   Neurological: Positive for tingling, weakness and numbness.   All other systems reviewed and are negative.      Objective   Blood pressure 140/90, temperature 97.3 °F (36.3 °C), temperature source Infrared, height 167.6 cm (66\"), weight 95.7 kg (211 lb).  Body mass index is 34.06 kg/m².    Physical Exam  Vitals signs and nursing note reviewed.   Constitutional:       Appearance: He is well-developed. He is not toxic-appearing.   HENT:      Head: Normocephalic and atraumatic.      Right Ear: Hearing normal.      Left Ear: Hearing normal.      Nose: Nose normal.   Eyes:      General: Lids are normal.      Conjunctiva/sclera: Conjunctivae normal.      Pupils: Pupils are equal, round, and reactive to light.   Neck:      Musculoskeletal: Normal range of motion.      Vascular: No JVD.   Cardiovascular:      Rate and Rhythm: Normal rate and regular rhythm.      Pulses:           Radial pulses are 2+ on the right side and 2+ on the left side.   Pulmonary:      Effort: Pulmonary effort is normal. No respiratory distress.      Breath sounds: No stridor. No wheezing.   Musculoskeletal:      Right shoulder: He exhibits decreased range of motion. He exhibits no tenderness.      Left shoulder: He exhibits decreased range of motion. He exhibits no tenderness.        Back:    Skin:     General: Skin is warm and dry.      Findings: No erythema or rash.   Neurological:      Mental Status: He is alert and oriented to person, place, and time.      GCS: GCS eye subscore is 4. GCS verbal subscore is 5. GCS motor subscore is 6.      Cranial Nerves: No cranial nerve deficit.      Motor: No abnormal muscle tone.      Deep Tendon Reflexes: Reflexes are normal and symmetric. Reflexes normal.      Reflex Scores:       Tricep reflexes are 2+ on the right side and 2+ on the left side.       Bicep reflexes are 2+ on the right side and 2+ on the left side.       Brachioradialis reflexes are 2+ on the right side and 2+ on the left " side.  Psychiatric:         Behavior: Behavior normal.         Thought Content: Thought content normal.         Judgment: Judgment normal.           Assessment/Plan     Independent Review of Radiographic Studies:      He does not have a recent MRI of his neck.  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  He also has x-rays of the cervical spine which redemonstrate the aforementioned chronic degenerative changes.    Medical Decision Making:      This is a 54-year-old man with progressive neck pain and what sounds like a right C6 radiculopathy.  I ordered a MRI of the cervical spine and I will follow-up with him after the aforementioned study has been completed.    Charles was seen today for neck pain.    Diagnoses and all orders for this visit:    Chronic neck pain  -     MRI Cervical Spine Without Contrast; Future    Right arm pain  -     MRI Cervical Spine Without Contrast; Future        No follow-ups on file.           This document signed by ROLANDO Best MD September 23, 2020 10:16 EDT

## 2020-10-09 ENCOUNTER — OFFICE VISIT (OUTPATIENT)
Dept: NEUROSURGERY | Facility: CLINIC | Age: 55
End: 2020-10-09

## 2020-10-09 VITALS — TEMPERATURE: 98 F | WEIGHT: 216 LBS | HEIGHT: 66 IN | BODY MASS INDEX: 34.72 KG/M2

## 2020-10-09 DIAGNOSIS — M50.30 DDD (DEGENERATIVE DISC DISEASE), CERVICAL: Primary | ICD-10-CM

## 2020-10-09 PROCEDURE — 99213 OFFICE O/P EST LOW 20 MIN: CPT | Performed by: NEUROLOGICAL SURGERY

## 2020-10-09 NOTE — PROGRESS NOTES
"Subjective     Chief Complaint: Follow-up right neck and arm pain    Patient ID: Charles Finney is a 55 y.o. male is here today for follow-up.    History of Present Illness    This is a 55-year-old man who I saw in consultation on September 23 with right arm and right-sided neck pain.  I ordered a MRI of his cervical spine to assess for cervical radiculopathy and he presents today to discuss the results of the study.  Unfortunately, he did not bring his scans with him and all I have is a radiology report.    The following portions of the patient's history were reviewed and updated as appropriate: allergies, current medications, past family history, past medical history, past social history, past surgical history and problem list.    Family history:   Family History   Problem Relation Age of Onset   • Breast cancer Mother    • Diabetes Father    • Kidney failure Father        Social history:   Social History     Socioeconomic History   • Marital status: Single     Spouse name: Not on file   • Number of children: Not on file   • Years of education: Not on file   • Highest education level: Not on file   Tobacco Use   • Smoking status: Never Smoker   • Smokeless tobacco: Never Used   Substance and Sexual Activity   • Alcohol use: Yes     Comment: Rarely   • Drug use: No       Review of Systems   Musculoskeletal: Positive for neck pain.   Neurological: Positive for weakness and numbness.   All other systems reviewed and are negative.      Objective   Temperature 98 °F (36.7 °C), temperature source Infrared, height 167.6 cm (66\"), weight 98 kg (216 lb).  Body mass index is 34.86 kg/m².    Physical Exam  Vitals signs reviewed.   Constitutional:       General: He is not in acute distress.     Appearance: He is well-developed. He is not diaphoretic.   HENT:      Head: Normocephalic and atraumatic.   Pulmonary:      Effort: Pulmonary effort is normal.   Skin:     General: Skin is warm and dry.   Neurological:      Mental " Status: He is alert and oriented to person, place, and time.   Psychiatric:         Behavior: Behavior normal.           Assessment/Plan     Independent Review of Radiographic Studies:      He has no new imaging for me to review    Medical Decision Making:      I have asked him to procure a copy of his MRI and deliver it to my office.  I will set up a phone visit with him to discuss the results of this MRI and any surgical options once I have the opportunity to review his MRI.  I was not impressed with his radiology report as far as potential etiology for his right-sided arm and neck pain.    Charles was seen today for neck pain.    Diagnoses and all orders for this visit:    DDD (degenerative disc disease), cervical        No follow-ups on file.           This document signed by ROLANDO Best MD October 9, 2020 11:21 EDT

## 2020-10-16 ENCOUNTER — OFFICE VISIT (OUTPATIENT)
Dept: NEUROSURGERY | Facility: CLINIC | Age: 55
End: 2020-10-16

## 2020-10-16 VITALS — HEIGHT: 66 IN | BODY MASS INDEX: 34.39 KG/M2 | WEIGHT: 214 LBS

## 2020-10-16 DIAGNOSIS — M54.2 CHRONIC NECK PAIN: Primary | ICD-10-CM

## 2020-10-16 DIAGNOSIS — G89.29 CHRONIC NECK PAIN: Primary | ICD-10-CM

## 2020-10-16 PROCEDURE — 99442 PR PHYS/QHP TELEPHONE EVALUATION 11-20 MIN: CPT | Performed by: NEUROLOGICAL SURGERY

## 2020-10-16 NOTE — PROGRESS NOTES
Subjective     Chief Complaint: Neck pain    Patient ID: Charles Finney is a 55 y.o. male is here today for follow-up.    History of Present Illness    This is a 55-year-old man who I saw in consultation on October 9 for some neck pain and right hand numbness.  He had a MRI of the cervical spine, however I was not able to review it at that time.  He provided a copy of his MRI and a phone follow-up was established.  He reports no significant change in his symptoms since his visit on the ninth.  He is primarily complaining of neck pain with some occasional numbness in his right hand.  He does not endorse a significant amount of right arm or shoulder pain.    The following portions of the patient's history were reviewed and updated as appropriate: allergies, current medications, past family history, past medical history, past social history, past surgical history and problem list.    Family history:   Family History   Problem Relation Age of Onset   • Breast cancer Mother    • Diabetes Father    • Kidney failure Father        Social history:   Social History     Socioeconomic History   • Marital status: Single     Spouse name: Not on file   • Number of children: Not on file   • Years of education: Not on file   • Highest education level: Not on file   Tobacco Use   • Smoking status: Never Smoker   • Smokeless tobacco: Never Used   Substance and Sexual Activity   • Alcohol use: Yes     Comment: Rarely   • Drug use: No       Review of Systems   Constitutional: Negative for activity change, appetite change, chills, diaphoresis, fatigue, fever and unexpected weight change.   HENT: Negative for congestion, dental problem, drooling, ear discharge, ear pain, facial swelling, hearing loss, mouth sores, nosebleeds, postnasal drip, rhinorrhea, sinus pressure, sinus pain, sneezing, sore throat, tinnitus, trouble swallowing and voice change.    Eyes: Negative for photophobia, pain, discharge, redness, itching and visual  "disturbance.   Respiratory: Negative for apnea, cough, choking, chest tightness, shortness of breath, wheezing and stridor.    Cardiovascular: Negative for chest pain, palpitations and leg swelling.   Gastrointestinal: Negative for abdominal distention, abdominal pain, anal bleeding, blood in stool, constipation, diarrhea, nausea, rectal pain and vomiting.   Endocrine: Negative for cold intolerance, heat intolerance, polydipsia, polyphagia and polyuria.   Genitourinary: Negative for decreased urine volume, difficulty urinating, discharge, dysuria, enuresis, flank pain, frequency, genital sores, hematuria, penile pain, penile swelling, scrotal swelling, testicular pain and urgency.   Musculoskeletal: Positive for neck pain. Negative for arthralgias, back pain, gait problem, joint swelling, myalgias and neck stiffness.   Skin: Negative for color change, pallor, rash and wound.   Allergic/Immunologic: Negative for environmental allergies, food allergies and immunocompromised state.   Neurological: Positive for weakness and numbness. Negative for dizziness, tremors, seizures, syncope, facial asymmetry, speech difficulty, light-headedness and headaches.   Hematological: Negative for adenopathy. Does not bruise/bleed easily.   Psychiatric/Behavioral: Negative for agitation, behavioral problems, confusion, decreased concentration, dysphoric mood, hallucinations, self-injury, sleep disturbance and suicidal ideas. The patient is not nervous/anxious and is not hyperactive.    All other systems reviewed and are negative.      Objective   Height 167.6 cm (66\"), weight 97.1 kg (214 lb).  Body mass index is 34.54 kg/m².    Physical Exam    Assessment/Plan     Independent Review of Radiographic Studies:      Available for my review is a MRI of the cervical spine which was performed on October 7, 2020.  This demonstrates disc osteophyte complexes at C4-5, C5-6, and C6-7.  This is superimposed on a congenitally narrowed spinal " canal.  There is multifocal lateral recess and neuroforaminal stenosis which for the most C4-5 and C6-7 on the right side.  There is loss of cervical lordosis.  The central canal is capacious without obvious radiographic evidence of cervical spinal cord impingement.    Medical Decision Making:      This is a 55-year-old man with chronic neck pain and some right hand numbness.  He is not a candidate for surgical intervention.  My recommendation is for pain management and physical therapy.  I would be happy to follow-up with him in about 3 months, or sooner if clinically indicated.    You have chosen to receive care through a telephone visit. Do you consent to use a telephone visit for your medical care today? Yes    Approximately 12 minutes was spent on today's phone conversation.      Diagnoses and all orders for this visit:    1. Chronic neck pain (Primary)  -     Ambulatory Referral to Physical Therapy Evaluate and treat; Stretching, ROM, Strengthening  -     Ambulatory Referral to Pain Management        Return in about 3 months (around 1/16/2021).           This document signed by ROLANDO Best MD October 16, 2020 10:44 EDT

## 2020-11-09 NOTE — PROGRESS NOTES
"This consultation was provided with the use of interactive audio through telephone that permits real time communication with the patient, as patient is unable to attend an in-office appointment due to the COVID-19 crisis. Consent for assessment and treatment was provided by the patient.  You have chosen to receive care through a telephone visit today. Do you consent to use a telephone visit for your medical care today?   Yes     Chief Complaint: \"My neck pain has started to bother me more frequently. I now have numbness in my hand.\"    History of Present Illness:   Patient: Mr. Charles Finney, 51 y.o. male originally referred by Dr. Best in consultation for intractable chronic neck and right shoulder pain. He reports a 3-4 year history of pain, which began without incident.  Today, patient reports stiffness through the neck and a dull ache in the shoulder blade, which he reports waxes and wanes, but more recently he reports his neck pain has became more bothersome to him.  He was last seen by me for follow-up on August 27, 2020, from previously undergoing a cervical epidural steroid injection performed on July 27, 2020.  At the time of his follow-up visit he reported complete pain relief for one day that appeared to be from the anesthetics. He appeared to have significant progression of his symptoms, with complaints of pain radiating down his right upper extremity into his right hand with constant numbness to the first 3 digits.  I then referred him for consultation with Dr. Best, he had been evaluated by him previously in 2016.  He was seen on October 16, 2020 by Dr. Best with a new MRI of his cervical spine.  At that time Dr. Best did not find him to be a candidate for surgical intervention, but to continue physical therapy and pain management.  He is participating in physical therapy as medically advised.  At his last office visit with me I increased his gabapentin to 300 mg 3 times daily, he denies side " effects, and reports significant analgesic benefit from medication. He reports today with combination of medication and physical therapy he is doing fairly well.  He requests consultation today for follow-up evaluation.  Pain description: intermittent exacerbation, described as aching and burning sensation.   Radiation of pain: The pain radiates from the neck to the right shoulder, right arm, and right hand with intermittent numbness to the first three digits of his right hand.  He denies left upper extremity symptoms.  Pain intensity today: 2/10   Average pain intensity last week: 4/10   Pain intensity ranges from: 2/10 to 5/10  Aggravating factors: Pain increases with extension, flexion and rotation of the cervical spine, and driving   Alleviating factors: Pain decreases with analgesics, hot shower, massager, and ice.   Associated symptoms:   Patient reports pain, numbness and weakness in the right upper extremity. He denies left upper extremity symptoms  Patient denies any new bladder or bowel problems.   Patient denies difficulties with his balance.   Patient denies headaches.    Review of previous therapies and additional medical records:  Charles Finney has already failed the following measures, including:   Conservative measures: oral analgesics, topical analgesics, physical therapy, ice, heat and home exercises, NSAIDs   Interventional measures: 10/29/2018: cervical epidural steroid injection at C6-C7 by right interlaminar approach  07/27/2020: SERGIO  Surgical measures: No history of cervical spine surgery.  Charles Finney underwent neurosurgical  consultation with Dr. Best on most recently on 10/16/2020, and was found not to be a surgical candidate at this time.  Charles Finney has a history of hypertension and is engaged in treatment.  In terms of current analgesics, Charles Finney takes: gabapentin, and Tylenol and SalonPas  I have reviewed the [facility] Report #[id number] consistent to medication  reconciliation.    Global Pain Scale 10-11 2018 11-26 2018 05-08 2019 01-08 2020 08-27 2020         Pain 12 4 6  9  12         Feelings 0 0 0  0  0         Clinical outcomes 19 0 4  8  12         Activities 18 0 0  7  6         GPS Total: 49 4 10  24  30           Review of New Diagnostic Studies:  MRI of the cervical spine without contrast on 10/7/2020: I have reviewed the images.  Mild degenerative spurring throughout.  C2-C3: Mild endplate spurring is present.  No spinal canal or foraminal stenosis is seen.  C3-C4: Mild endplate spurring, with disc bulge no spinal canal or foraminal stenosis is seen.  C4-C5: There is a broad based central disc protrusion, along with mild endplate spurring and mild spinal stenosis.  There is mild bilateral neural foraminal narrowing seen.  C5-C6: Left broad-based disc protrusion is present, creating mild crowding of the left nerve root, with mild spinal canal stenosis and mild bilateral neural foraminal stenosis.  C6-C7: Herniated disc protrusion, creating mild to moderate central spinal canal stenosis, there is mild endplate spurring, and moderate bilateral neuroforaminal stenosis.  C7-T1: Normal.    Review of diagnostic Studies:   MRI, cervical spine, without, 08/30/2016; The cord signal is normal. C3-C4: Facet hypertrophy, disc osteophyte complex, moderate central canal narrowing. C4-C5: Facet and uncovertebral joint hypertrophy. Disc osteophyte. Moderate to severe central canal and bilateral neural foraminal narrowing. C5-C6: Facet and uncovertebral joint hypertrophy. Disc osteophyte. Severe central canal and bilateral neural foraminal narrowing C6-C7: Facet and uncovertebral joint hypertrophy. Mild central canal and bilateral neural foraminal narrowing. C7-T1: Facet hypertrophy no stenosis.    Review of Systems   Musculoskeletal: Positive for back pain, neck pain and neck stiffness.   Neurological: Positive for numbness.   Psychiatric/Behavioral: Positive for sleep  disturbance.   All other systems reviewed and are negative.     Patient Active Problem List   Diagnosis   • Cervical stenosis of spinal canal   • Foraminal stenosis of cervical region   • Cervical spondylosis without myelopathy   • DDD (degenerative disc disease), cervical   • Essential hypertension   • Mild obesity   • Hypercholesterolemia   • Mild intermittent asthma without complication     Past Medical History:   Diagnosis Date   • Asthma    • Back problem    • Cervical disc disorder    • Chronic pain disorder    • Hypertension    • Joint pain    • Low back pain    • Lumbosacral disc disease    • Neck pain    • Umbilical hernia       Past Surgical History:   Procedure Laterality Date   • CYSTOSCOPY BLADDER STONE LITHOTRIPSY     • KIDNEY STONE SURGERY  01/01/2013    Lithotripsy   • KNEE CARTILAGE SURGERY  01/01/1991   • NASAL SEPTUM SURGERY  05/01/2016   • TONSILLECTOMY     • UMBILICAL HERNIA REPAIR  01/01/1999         Family History   Problem Relation Age of Onset   • Breast cancer Mother    • Diabetes Father    • Kidney failure Father          Social History     Socioeconomic History   • Marital status: Single     Spouse name: Not on file   • Number of children: Not on file   • Years of education: Not on file   • Highest education level: Not on file   Tobacco Use   • Smoking status: Never Smoker   • Smokeless tobacco: Never Used   Substance and Sexual Activity   • Alcohol use: Yes     Comment: Rarely   • Drug use: No        Current Outpatient Medications:   •  albuterol (PROVENTIL HFA;VENTOLIN HFA) 108 (90 BASE) MCG/ACT inhaler, Inhale 2 puffs every 4 (four) hours as needed for wheezing., Disp: , Rfl:   •  Gonzales Low Dose 81 MG EC tablet, Take 81 mg by mouth Daily., Disp: , Rfl:   •  FLOVENT DISKUS 250 MCG/BLIST aerosol powder , , Disp: , Rfl:   •  fluticasone (FLONASE) 50 MCG/ACT nasal spray, 2 sprays into each nostril daily., Disp: , Rfl:   •  gabapentin (NEURONTIN) 300 MG capsule, Take 1 capsule by mouth 3  (Three) Times a Day., Disp: 90 capsule, Rfl: 3  •  losartan (COZAAR) 50 MG tablet, Take 50 mg by mouth Daily., Disp: , Rfl:   •  nortriptyline (PAMELOR) 10 MG capsule, Take 1 capsule by mouth 2 (Two) Times a Day., Disp: 60 capsule, Rfl: 5  •  pravastatin (PRAVACHOL) 80 MG tablet, , Disp: , Rfl:       Allergies   Allergen Reactions   • Other      Environmental allergies     • Penicillins          There were no vitals taken for this visit.     Due to the constraints of the telemedicine format, no physical exam was performed     Physical Exam: (Previous PE from last office visit)  Constitutional: Patient is oriented to person, place, and time. Vital signs are normal. Patient appears well-developed and well-nourished.   HENT: Head: Normocephalic and atraumatic. Eyes: Conjunctivae and lids are normal. Pupils: Equal, round, reactive to light.   Neck: Trachea normal. Neck supple. No JVD present.   Pulmonary Respiratory effort: No increased work of breathing or signs of respiratory distress. Auscultation of lungs: Clear to auscultation.   Cardiovascular Auscultation of heart: Normal rate and rhythm, normal S1 and S2, no murmurs. Peripheral vascular exam: Normal. Carotid: right 2+, left 2+, no bruit on the right and no bruit on the left. Femoral: right 2+, left 2+, no bruit on the right and no bruit on the left. Posterior tibialis: right 2+ and left 2+. Dorsalis pedis: right 2+ and left 2+. No edema.   Abdomen: The abdomen was soft and nontender. Bowel sounds were normal.   Lymphatic: No cervical adenopathy  Musculoskeletal   Gait and station: Gait evaluation demonstrated a normal gait.   Cervical spine: Passive and active range of motion is limited secondary to pain. Extension, flexion, lateral flexion, rotation of the cervical spine increased and reproduced pain. Cervical facet joint loading maneuvers are negative.   Muscles: Presence of active trigger points right levator scapulae, right rhomboids    Shoulders: The range  of motion of the glenohumeral joints is full and without pain. Rotator cuff strength is 5/5.   Thoracic spine: Thoracic facet joint loading maneuvers are negative.   Neurological:   Patient is alert and oriented to person, place, and time. Speech: speech is normal. Cortical function: Normal mental status.   Cranial nerves: Cranial nerves 2-12 intact.   Reflex Scores:  Right brachioradialis: 1+  Left brachioradialis: 1+  Right biceps: 1+  Left biceps: 1+  Right triceps: 1+  Left triceps: 1+  Motor strength: 5/5, except for 4/5 right bicep  Motor Tone: normal tone.   Involuntary movements: none.   Superficial/Primitive Reflexes: primitive reflexes were absent.   Right Pierre: absent  Left Pierre: absent  Right ankle clonus: absent  Left ankle clonus: absent   Spurling sign is positive on the right. Lhermitte sign is negative. Neck tornado test is positive. Negative long tract signs. Straight leg raising test is negative. Femoral stretch sign is negative.   Sensation: No sensory loss. Sensory exam: intact to light touch, intact pain and temperature sensation, intact vibration sensation and normal proprioception.   Coordination: Normal finger to nose and heel to shin. Normal balance and negative. Romberg's sign negative.   Skin and subcutaneous tissue: Skin is warm and intact. No rash noted. No cyanosis.   Psychiatric: Judgment and insight: Normal.   Orientation to person, place and time: Normal.   Recent and remote memory: Intact.   Mood and affect: Normal.     ASSESSMENT:   1. Foraminal stenosis of cervical region    2. DDD (degenerative disc disease), cervical    3. Cervical stenosis of spinal canal    4. Cervical spondylosis without myelopathy    5. Mild obesity          PLAN/MEDICAL DECISION MAKING:  I had a lengthy conversation with Mr. Charles Finney regarding his chronic pain condition and potential therapeutic options including risks, benefits, alternative therapies, to name a few. Patient has failed to  obtain pain relief with conservative measures. He was seen on October 16, 2020 by Dr. Best with a new MRI of his cervical spine.  At that time Dr. Best did not find him to be a candidate for surgical intervention, but to continue physical therapy and pain management.  He is participating in physical therapy as medically advised.  At his last office visit with me I increase his gabapentin to 300 mg 3 times daily, he denies side effects, and reports significant analgesic benefit from medication.  MRI of the cervical spine on 10/7/2020, demonstrated mild degenerative spurring throughout, with areas of spinal canal stenosis and mild bilateral neural foraminal stenosis.  At C5-C6 there is a broad-based disc protrusion creating mild crowding of the descending left nerve root.  At C6-C7 there is a central herniated disc protrusion creating mild to moderate central spinal canal stenosis and moderate bilateral neural foraminal stenosis. He reports today with combination of medication and physical therapy he is doing fairly well.  I have reviewed all available patient's medical records as well as previous therapies as referenced above, and discussed the patient with Dr. Solis.  Therefore, I have proposed the following plan:  1. Interventional pain management measures: None indicated at this time. If pain recurs we could repeat cervical epidural steroid injection at C6-C7 by right interlaminar approach. We may repeat another epidural depending on patient's outcome. Patient will follow-up with Dr. Best thereafter.  2. Follow up in 6 months for medication refill or sooner if needed  3. Long-term rehabilitation efforts:  A. The patient does not have a history of falls. I did complete a risk assessment for falls.   B. Patient will continue a comprehensive physical therapy program for upper body strengthening/posture correction, gait and balance training, neurodynamics, E-STIM, myofascial release, cupping and dry  needling  C. Start an exercise program such as daily walks for fitness  4. Pharmacological measures:  A. Continue gabapentin 300 mg three times daily  B. Continue nortriptyline 10 mg 1-2 tablets at bedtime as needed for insomnia  5. The patient has been instructed to contact my office with any questions or difficulties. The patient understands the plan and agrees to proceed accordingly.    Patient has been made aware that patient will continue to have access to telephone call, tele-health or e-visit, or in office visit if an emergent or urgent issue arises.     This visit was performed via Telehealth. Patient was advised to call us back or contact a primary care provider, Urgent Care or the Emergency Department if symptoms worsen or treatment provided does not resolve symptoms. Patient verbalizes understanding of medication dosage, comfort measures, instructions for treatment, and follow-up.    This visit has been scheduled as a phone visit to comply with patient safety concerns in accordance with CDC recommendations. Total time of discussion was 10 minutes.           Patient Care Team:  Caridad Kaba MD as PCP - General (Family Medicine)  Rakesh Best MD as Consulting Physician (Neurosurgery)  John Del Castillo MD as Consulting Physician (Cardiology)  Latrell Solis MD as Consulting Physician (Anesthesiology)  Thomas Brar PA-C as Physician Assistant (Physician Assistant)       No orders of the defined types were placed in this encounter.          Future Appointments   Date Time Provider Department Center   11/10/2020  3:00 PM Stephany Stevenson APRN [facility]   11/22/2020  1:15 PM [id number]   11/24/2020 11:30 AM Yogesh Cedeño MD [facility] None   1/15/2021 11:15 AM Delaney Reis PA-C [facility]         MARNIE Jacobson

## 2020-11-10 ENCOUNTER — OFFICE VISIT (OUTPATIENT)
Dept: PAIN MEDICINE | Facility: CLINIC | Age: 55
End: 2020-11-10

## 2020-11-10 DIAGNOSIS — M48.02 FORAMINAL STENOSIS OF CERVICAL REGION: ICD-10-CM

## 2020-11-10 DIAGNOSIS — M50.30 DDD (DEGENERATIVE DISC DISEASE), CERVICAL: ICD-10-CM

## 2020-11-10 DIAGNOSIS — E66.9 MILD OBESITY: ICD-10-CM

## 2020-11-10 DIAGNOSIS — M48.02 CERVICAL STENOSIS OF SPINAL CANAL: ICD-10-CM

## 2020-11-10 DIAGNOSIS — M47.812 CERVICAL SPONDYLOSIS WITHOUT MYELOPATHY: ICD-10-CM

## 2020-11-10 PROCEDURE — 99441 PR PHYS/QHP TELEPHONE EVALUATION 5-10 MIN: CPT | Performed by: NURSE PRACTITIONER

## 2020-11-19 RX ORDER — SODIUM, POTASSIUM,MAG SULFATES 17.5-3.13G
2 SOLUTION, RECONSTITUTED, ORAL ORAL TAKE AS DIRECTED
Qty: 354 ML | Refills: 0 | Status: SHIPPED | OUTPATIENT
Start: 2020-11-19 | End: 2022-11-29

## 2020-11-22 ENCOUNTER — APPOINTMENT (OUTPATIENT)
Dept: PREADMISSION TESTING | Facility: HOSPITAL | Age: 55
End: 2020-11-22

## 2020-11-22 PROCEDURE — U0004 COV-19 TEST NON-CDC HGH THRU: HCPCS

## 2020-11-22 PROCEDURE — C9803 HOPD COVID-19 SPEC COLLECT: HCPCS

## 2020-11-23 LAB — SARS-COV-2 RNA RESP QL NAA+PROBE: NOT DETECTED

## 2020-11-24 ENCOUNTER — OUTSIDE FACILITY SERVICE (OUTPATIENT)
Dept: GASTROENTEROLOGY | Facility: CLINIC | Age: 55
End: 2020-11-24

## 2020-11-24 PROCEDURE — 45378 DIAGNOSTIC COLONOSCOPY: CPT | Performed by: INTERNAL MEDICINE

## 2021-01-15 ENCOUNTER — OFFICE VISIT (OUTPATIENT)
Dept: NEUROSURGERY | Facility: CLINIC | Age: 56
End: 2021-01-15

## 2021-01-15 VITALS
BODY MASS INDEX: 34.87 KG/M2 | SYSTOLIC BLOOD PRESSURE: 120 MMHG | WEIGHT: 217 LBS | TEMPERATURE: 97.6 F | DIASTOLIC BLOOD PRESSURE: 70 MMHG | HEIGHT: 66 IN

## 2021-01-15 DIAGNOSIS — E66.01 MORBIDLY OBESE (HCC): ICD-10-CM

## 2021-01-15 DIAGNOSIS — M54.2 CERVICALGIA: Primary | ICD-10-CM

## 2021-01-15 PROCEDURE — 99213 OFFICE O/P EST LOW 20 MIN: CPT | Performed by: PHYSICIAN ASSISTANT

## 2021-01-15 RX ORDER — LOSARTAN POTASSIUM AND HYDROCHLOROTHIAZIDE 12.5; 5 MG/1; MG/1
1 TABLET ORAL DAILY
COMMUNITY
Start: 2021-01-11

## 2021-01-15 RX ORDER — FLUTICASONE FUROATE 100 UG/1
1 POWDER RESPIRATORY (INHALATION) 2 TIMES DAILY
COMMUNITY
Start: 2020-12-24 | End: 2022-11-29

## 2021-01-15 NOTE — PROGRESS NOTES
Subjective     Chief Complaint: neck pain     Patient ID: Charles Finney is a 55 y.o. male seen for consultation today at the request of  Cairdad Kaba MD    History of Present Illness    This is a 56 yo gentleman we have been following for some neck pain and right hand numbness.  He presents today in routine follow-up and reports his symptoms have significantly improved since last seen in the office several months ago.  He participated in a few sessions of physical therapy and has been doing some exercises at home.  He gets occasional flareups of neck pain that are manageable.  He denies any radiating pain into upper extremities.  Admits to occasional numbness/tingling in his third fourth fifth digit of his right hand that is not very bothersome.  Denies weakness, gait instability, saddle anesthesia or bowel bladder function.    The following portions of the patient's history were reviewed and updated as appropriate: allergies, current medications, past family history, past medical history, past social history, past surgical history and problem list.    Family history:   Family History   Problem Relation Age of Onset   • Breast cancer Mother    • Diabetes Father    • Kidney failure Father        Social history:   Social History     Socioeconomic History   • Marital status: Single     Spouse name: Not on file   • Number of children: Not on file   • Years of education: Not on file   • Highest education level: Not on file   Tobacco Use   • Smoking status: Never Smoker   • Smokeless tobacco: Never Used   Substance and Sexual Activity   • Alcohol use: Yes     Comment: Rarely   • Drug use: No       Review of Systems   Constitutional: Negative.    HENT: Negative.    Eyes: Negative.    Respiratory: Negative.    Cardiovascular: Negative.    Gastrointestinal: Negative.    Endocrine: Negative.    Genitourinary: Negative.    Musculoskeletal: Positive for back pain, neck pain and neck stiffness.   Skin: Negative.   "  Allergic/Immunologic: Negative.    Neurological: Negative.    Hematological: Negative.    Psychiatric/Behavioral: Negative.        Objective   Blood pressure 120/70, temperature 97.6 °F (36.4 °C), temperature source Infrared, height 167.6 cm (66\"), weight 98.4 kg (217 lb).  Body mass index is 35.02 kg/m².  Patient's Body mass index is 35.02 kg/m². BMI is above normal parameters. Recommendations include: exercise counseling and nutrition counseling.      Physical Exam  Constitutional:       General: He is not in acute distress.     Appearance: Normal appearance. He is not ill-appearing, toxic-appearing or diaphoretic.   HENT:      Head: Normocephalic and atraumatic.   Neck:      Musculoskeletal: Normal range of motion.   Pulmonary:      Effort: Pulmonary effort is normal.   Skin:     General: Skin is warm and dry.   Neurological:      General: No focal deficit present.      Mental Status: He is alert and oriented to person, place, and time.      GCS: GCS eye subscore is 4. GCS verbal subscore is 5. GCS motor subscore is 6.      Motor: Motor function is intact.      Gait: Gait is intact.   Psychiatric:         Mood and Affect: Mood normal.         Behavior: Behavior normal.           Assessment/Plan     This is a 55-year-old gentleman with chronic neck pain.  His symptoms have improved with conservative treatment.  I reviewed the signs and symptoms of cervical myelopathy and radiculopathy with him.  Instructed him to call with new or worsening symptoms.  We will be happy to follow-up with him on an as-needed basis moving forward.    Diagnoses and all orders for this visit:    1. Cervicalgia (Primary)    2. Morbidly obese (CMS/HCC)        Return if symptoms worsen or fail to improve.             Delaney Reis PA-C        "

## 2021-01-18 DIAGNOSIS — M48.02 FORAMINAL STENOSIS OF CERVICAL REGION: ICD-10-CM

## 2021-01-19 RX ORDER — GABAPENTIN 300 MG/1
CAPSULE ORAL
Qty: 90 CAPSULE | Refills: 3 | Status: SHIPPED | OUTPATIENT
Start: 2021-01-19 | End: 2022-05-26

## 2021-05-10 NOTE — PROGRESS NOTES
"This consultation was provided with the use of interactive audio through telephone that permits real time communication with the patient, as patient is unable to attend an in-office appointment due to the COVID-19 crisis. Consent for assessment and treatment was provided by the patient.  You have chosen to receive care through a telephone visit today. Do you consent to use a telephone visit for your medical care today?   Yes     Chief Complaint: \"I am doing well, my numbness is better.\"    History of Present Illness:   Patient: Mr. Charles Finney, 55 y.o. male originally referred by Dr. Best in consultation for intractable chronic neck and right shoulder pain. He reports a 3-4 year history of pain, which began without incident.  I last spoke with Mr. Finney on November 10, 2020, for follow-up evaluation through telemedicine.  At that point, he was reporting a waxing and waning effect of his neck pain, otherwise had been doing some physical therapy exercises at home and was doing well.  He was seen on October 16, 2020 by Dr. Best with a new MRI of his cervical spine.  At that time Dr. Best did not find him to be a candidate for surgical intervention, but to continue physical therapy and pain management.  He is participating in physical therapy as medically advised.  He is currently taking gabapentin 300 mg 3 times daily, he denies side effects and reports some analgesic benefit from medication. Additionally, he reports he has not noticed much numbness in his right extremity and right fingers as before.  He requests consultation today for follow-up evaluation.  Pain description: intermittent exacerbation, described as aching and burning sensation.   Radiation of pain: The pain radiates from the neck to the right shoulder, right arm, and right hand with intermittent numbness to the first three digits of his right hand.  He denies left upper extremity symptoms.  Pain intensity today: 2/10   Average pain intensity " last week: 4/10   Pain intensity ranges from: 2/10 to 5/10  Aggravating factors: Pain increases with extension, flexion and rotation of the cervical spine, and driving   Alleviating factors: Pain decreases with analgesics, hot shower, massager, and ice.   Associated symptoms:   Patient reports pain, numbness and weakness in the right upper extremity. He denies left upper extremity symptoms  Patient denies any new bladder or bowel problems.   Patient denies difficulties with his balance.   Patient denies headaches.    Review of previous therapies and additional medical records:  Charles Finney has already failed the following measures, including:   Conservative measures: oral analgesics, topical analgesics, physical therapy, ice, heat and home exercises, NSAIDs   Interventional measures: 10/29/2018: cervical epidural steroid injection at C6-C7 by right interlaminar approach  07/27/2020: SERGIO  Surgical measures: No history of cervical spine surgery.  Charles Finney underwent neurosurgical  consultation with Dr. Best most recently on 10/16/2020, and was found not to be a surgical candidate at this time.  Charles Finney has a history of hypertension and is engaged in treatment.  In terms of current analgesics, Charles Finney takes: gabapentin, and Tylenol and SalonPas  I have reviewed the [facility] Report #[id number] consistent to medication reconciliation.    Global Pain Scale 10-11  2018 11-26  2018 05-08  2019  01-08  2020  08-27  2020         Pain 12 4 6  9  12         Feelings 0 0 0  0  0         Clinical outcomes 19 0 4  8  12         Activities 18 0 0  7  6         GPS Total: 49 4 10  24  30           Review of Diagnostic Studies:  MRI of the cervical spine without contrast on 10/7/2020: I have reviewed the images.  Mild degenerative spurring throughout.  C2-C3: Mild endplate spurring is present.  No spinal canal or foraminal stenosis is seen.  C3-C4: Mild endplate spurring, with disc bulge no spinal canal or  foraminal stenosis is seen.  C4-C5: There is a broad based central disc protrusion, along with mild endplate spurring and mild spinal stenosis.  There is mild bilateral neural foraminal narrowing seen.  C5-C6: Left broad-based disc protrusion is present, creating mild crowding of the left nerve root, with mild spinal canal stenosis and mild bilateral neural foraminal stenosis.  C6-C7: Herniated disc protrusion, creating mild to moderate central spinal canal stenosis, there is mild endplate spurring, and moderate bilateral neuroforaminal stenosis.  C7-T1: Normal.      Review of Systems   Musculoskeletal: Positive for back pain, neck pain and neck stiffness.   Neurological: Positive for numbness.   Psychiatric/Behavioral: Positive for sleep disturbance.   All other systems reviewed and are negative.     Patient Active Problem List   Diagnosis   • Cervical stenosis of spinal canal   • Foraminal stenosis of cervical region   • Cervical spondylosis without myelopathy   • DDD (degenerative disc disease), cervical   • Essential hypertension   • Morbidly obese (CMS/HCC)   • Hypercholesterolemia   • Mild intermittent asthma without complication     Past Medical History:   Diagnosis Date   • Asthma    • Back problem    • Cervical disc disorder    • Chronic pain disorder    • Hypertension    • Joint pain    • Low back pain    • Lumbosacral disc disease    • Neck pain    • Umbilical hernia       Past Surgical History:   Procedure Laterality Date   • CYSTOSCOPY BLADDER STONE LITHOTRIPSY     • KIDNEY STONE SURGERY  01/01/2013    Lithotripsy   • KNEE CARTILAGE SURGERY  01/01/1991   • NASAL SEPTUM SURGERY  05/01/2016   • TONSILLECTOMY     • UMBILICAL HERNIA REPAIR  01/01/1999         Family History   Problem Relation Age of Onset   • Breast cancer Mother    • Diabetes Father    • Kidney failure Father          Social History     Socioeconomic History   • Marital status: Single     Spouse name: Not on file   • Number of children:  Not on file   • Years of education: Not on file   • Highest education level: Not on file   Tobacco Use   • Smoking status: Never Smoker   • Smokeless tobacco: Never Used   Substance and Sexual Activity   • Alcohol use: Yes     Comment: Rarely   • Drug use: No        Current Outpatient Medications:   •  albuterol (PROVENTIL HFA;VENTOLIN HFA) 108 (90 BASE) MCG/ACT inhaler, Inhale 2 puffs every 4 (four) hours as needed for wheezing., Disp: , Rfl:   •  Arnuity Ellipta 100 MCG/ACT aerosol powder , Inhale 1 puff 2 (Two) Times a Day., Disp: , Rfl:   •  Gonzales Low Dose 81 MG EC tablet, Take 81 mg by mouth Daily., Disp: , Rfl:   •  FLOVENT DISKUS 250 MCG/BLIST aerosol powder , , Disp: , Rfl:   •  fluticasone (FLONASE) 50 MCG/ACT nasal spray, 2 sprays into each nostril daily., Disp: , Rfl:   •  gabapentin (NEURONTIN) 300 MG capsule, TAKE 1 CAPSULE BY MOUTH THREE TIMES DAILY, Disp: 90 capsule, Rfl: 3  •  losartan (COZAAR) 50 MG tablet, Take 50 mg by mouth Daily., Disp: , Rfl:   •  losartan-hydrochlorothiazide (HYZAAR) 50-12.5 MG per tablet, Take 1 tablet by mouth Daily., Disp: , Rfl:   •  nortriptyline (PAMELOR) 10 MG capsule, Take 1 capsule by mouth 2 (Two) Times a Day., Disp: 60 capsule, Rfl: 5  •  pravastatin (PRAVACHOL) 80 MG tablet, , Disp: , Rfl:   •  sodium-potassium-magnesium sulfates (Suprep Bowel Prep Kit) 17.5-3.13-1.6 GM/177ML solution oral solution, Take 2 bottles by mouth Take As Directed. Do Not Eat The Day Before Your Procedure. Call [phone] for questions., Disp: 354 mL, Rfl: 0      Allergies   Allergen Reactions   • Other      Environmental allergies     • Penicillins          There were no vitals taken for this visit.     Due to the constraints of the telemedicine format, no physical exam was performed     Physical Exam: (Previous PE from last office visit)  Constitutional: Patient is oriented to person, place, and time. Vital signs are normal. Patient appears well-developed and well-nourished.   HENT:  Head: Normocephalic and atraumatic. Eyes: Conjunctivae and lids are normal. Pupils: Equal, round, reactive to light.   Neck: Trachea normal. Neck supple. No JVD present.   Pulmonary Respiratory effort: No increased work of breathing or signs of respiratory distress. Auscultation of lungs: Clear to auscultation.   Cardiovascular Auscultation of heart: Normal rate and rhythm, normal S1 and S2, no murmurs. Peripheral vascular exam: Normal. Carotid: right 2+, left 2+, no bruit on the right and no bruit on the left. Femoral: right 2+, left 2+, no bruit on the right and no bruit on the left. Posterior tibialis: right 2+ and left 2+. Dorsalis pedis: right 2+ and left 2+. No edema.   Abdomen: The abdomen was soft and nontender. Bowel sounds were normal.   Lymphatic: No cervical adenopathy  Musculoskeletal   Gait and station: Gait evaluation demonstrated a normal gait.   Cervical spine: Passive and active range of motion is limited secondary to pain. Extension, flexion, lateral flexion, rotation of the cervical spine increased and reproduced pain. Cervical facet joint loading maneuvers are negative.   Muscles: Presence of active trigger points right levator scapulae, right rhomboids    Shoulders: The range of motion of the glenohumeral joints is full and without pain. Rotator cuff strength is 5/5.   Thoracic spine: Thoracic facet joint loading maneuvers are negative.   Neurological:   Patient is alert and oriented to person, place, and time. Speech: speech is normal. Cortical function: Normal mental status.   Cranial nerves: Cranial nerves 2-12 intact.   Reflex Scores:  Right brachioradialis: 1+  Left brachioradialis: 1+  Right biceps: 1+  Left biceps: 1+  Right triceps: 1+  Left triceps: 1+  Motor strength: 5/5, except for 4/5 right bicep  Motor Tone: normal tone.   Involuntary movements: none.   Superficial/Primitive Reflexes: primitive reflexes were absent.   Right Pierre: absent  Left Pierre: absent  Right ankle  clonus: absent  Left ankle clonus: absent   Spurling sign is positive on the right. Lhermitte sign is negative. Neck tornado test is positive. Negative long tract signs. Straight leg raising test is negative. Femoral stretch sign is negative.   Sensation: No sensory loss. Sensory exam: intact to light touch, intact pain and temperature sensation, intact vibration sensation and normal proprioception.   Coordination: Normal finger to nose and heel to shin. Normal balance and negative. Romberg's sign negative.   Skin and subcutaneous tissue: Skin is warm and intact. No rash noted. No cyanosis.   Psychiatric: Judgment and insight: Normal.   Orientation to person, place and time: Normal.   Recent and remote memory: Intact.   Mood and affect: Normal.     ASSESSMENT:   1. Foraminal stenosis of cervical region    2. DDD (degenerative disc disease), cervical    3. Cervical spondylosis without myelopathy    4. Mild obesity          PLAN/MEDICAL DECISION MAKING:  I had a lengthy conversation with Mr. Charles Finney regarding his chronic pain condition and potential therapeutic options including risks, benefits, alternative therapies, to name a few. Patient has failed to obtain pain relief with conservative measures. He was seen on October 16, 2020 by Dr. Best with a new MRI of his cervical spine.  At that time Dr. Best did not find him to be a candidate for surgical intervention, but to continue physical therapy and pain management.  He is participating in physical therapy as medically advised.  MRI of the cervical spine on 10/7/2020, demonstrated mild degenerative spurring throughout, with areas of spinal canal stenosis and mild bilateral neural foraminal stenosis.  At C5-C6 there is a broad-based disc protrusion creating mild crowding of the descending left nerve root.  At C6-C7 there is a central herniated disc protrusion creating mild to moderate central spinal canal stenosis and moderate bilateral neural foraminal  stenosis. He reports today he is doing fairly well, he is still noticing some significant symptoms especially throughout the day into the evening.  We have discussed increasing gabapentin to 300 mg twice daily, and 600 mg at night.  Additionally, he reports he has noticed a decrease in the numbness in his right upper extremity and fingers that was present before.  I have reviewed all available patient's medical records as well as previous therapies as referenced above.  Therefore, I have proposed the following plan:  1. Interventional pain management measures: None indicated at this time. If pain recurs we could repeat cervical epidural steroid injection at C6-C7 by right interlaminar approach. We may repeat another epidural depending on patient's outcome. Patient will follow-up with Dr. Best thereafter.  2. Follow up in 6 months for medication refill or sooner if needed  3. Long-term rehabilitation efforts:  A. The patient does not have a history of falls. I did complete a risk assessment for falls.   B. Patient will start a comprehensive physical therapy program for upper body strengthening/posture correction, gait and balance training, neurodynamics, E-STIM, myofascial release, cupping and dry needling if pain recurs  C. Start an exercise program such as daily walks for fitness  4. Pharmacological measures:  A.  Increase gabapentin to 300 mg twice daily and 600 mg at night.  B. Continue nortriptyline 10 mg 1-2 tablets at bedtime as needed for insomnia  5. The patient has been instructed to contact my office with any questions or difficulties. The patient understands the plan and agrees to proceed accordingly.    Patient has been made aware that patient will continue to have access to telephone call, tele-health or e-visit, or in office visit if an emergent or urgent issue arises.     This visit was performed via Telehealth. Patient was advised to call us back or contact a primary care provider, Urgent Care or  the Emergency Department if symptoms worsen or treatment provided does not resolve symptoms. Patient verbalizes understanding of medication dosage, comfort measures, instructions for treatment, and follow-up.    This visit has been scheduled as a phone visit to comply with patient safety concerns in accordance with CDC recommendations. Total time of discussion was 7 minutes.           Patient Care Team:  Caridad Kaba MD as PCP - General (Family Medicine)  Rakesh Best MD as Consulting Physician (Neurosurgery)  John Del Castillo MD as Consulting Physician (Cardiology)  Latrell Solis MD as Consulting Physician (Anesthesiology)  Thomas Brar PA-C as Physician Assistant (Physician Assistant)       No orders of the defined types were placed in this encounter.          Future Appointments   Date Time Provider Department Center   5/12/2021 11:00 AM Stephany Stevenson APRN [facility]

## 2021-05-12 ENCOUNTER — OFFICE VISIT (OUTPATIENT)
Dept: PAIN MEDICINE | Facility: CLINIC | Age: 56
End: 2021-05-12

## 2021-05-12 DIAGNOSIS — M50.30 DDD (DEGENERATIVE DISC DISEASE), CERVICAL: ICD-10-CM

## 2021-05-12 DIAGNOSIS — M47.812 CERVICAL SPONDYLOSIS WITHOUT MYELOPATHY: ICD-10-CM

## 2021-05-12 DIAGNOSIS — E66.9 MILD OBESITY: ICD-10-CM

## 2021-05-12 DIAGNOSIS — M48.02 FORAMINAL STENOSIS OF CERVICAL REGION: ICD-10-CM

## 2021-05-12 PROCEDURE — 99441 PR PHYS/QHP TELEPHONE EVALUATION 5-10 MIN: CPT | Performed by: NURSE PRACTITIONER

## 2021-05-12 RX ORDER — GABAPENTIN 300 MG/1
300 CAPSULE ORAL 4 TIMES DAILY
Qty: 120 CAPSULE | Refills: 5 | Status: SHIPPED | OUTPATIENT
Start: 2021-05-12 | End: 2021-11-10 | Stop reason: SDUPTHER

## 2021-11-10 ENCOUNTER — TELEPHONE (OUTPATIENT)
Dept: PAIN MEDICINE | Facility: CLINIC | Age: 56
End: 2021-11-10

## 2021-11-10 DIAGNOSIS — M50.30 DDD (DEGENERATIVE DISC DISEASE), CERVICAL: ICD-10-CM

## 2021-11-10 DIAGNOSIS — M48.02 FORAMINAL STENOSIS OF CERVICAL REGION: ICD-10-CM

## 2021-11-10 RX ORDER — GABAPENTIN 300 MG/1
300 CAPSULE ORAL 4 TIMES DAILY
Qty: 120 CAPSULE | Refills: 5 | Status: SHIPPED | OUTPATIENT
Start: 2021-11-10 | End: 2022-05-23

## 2021-11-10 NOTE — TELEPHONE ENCOUNTER
Spoke with pt and advised that he is okay to reschedule with Stephany, and that she will fill his current meds until his appointment in January. Pt understood.

## 2021-11-10 NOTE — TELEPHONE ENCOUNTER
Caller: CALLUM BROWNLEE    Relationship to patient: SELF    Best call back number:     Type of visit: 6 MONTH F/U/MEDICATION REFILL    Requested date: THE WEEK OF 11/15/21 PREFERRED BUT NEXT IF NOT AVAILABLE THEN NEXT AVAILABLE    If rescheduling, when is the original appointment: 11/11/21 - RESCHEDULING DUE TO A FAMILY MATTER    Additional notes:I WAS UNABLE TO RESCHEDULE THIS PATIENT. WHEN I TRIED TO RESCHEDULE MY CALENDAR WENT ALL THE WAY OUT TO December OF 2022 AND WOULD NOT LET ME SCHEDULE. ATTEMPTED TO TRANSFER TO  TWICE BUT WAS UNABLE TO TRANSFER TO OFFICE.

## 2022-04-14 ENCOUNTER — TELEPHONE (OUTPATIENT)
Dept: PAIN MEDICINE | Facility: CLINIC | Age: 57
End: 2022-04-14

## 2022-04-14 NOTE — TELEPHONE ENCOUNTER
Provider: SUSAN FULTON   Caller: CALLUM BROWNLEE  Relationship to Patient: SELF  Phone Number: [phone]  Reason for Call: PATIENT CALLED WANTING TO KNOW WHAT THE BEST OVER THE COUNTER PAIN MEDICATION WOULD BE. PATIENT REQUESTING CALL BACK, PLEASE ADVISE.

## 2022-04-14 NOTE — TELEPHONE ENCOUNTER
Spoke with pt and advised him that Stephany recommended Dual Action tylenol/ibuprofen to try if he'd like. I inquired if he needed something prescription written and pt stated no he just needed something a little extra to help. Pt was appreciative of the call.

## 2022-05-23 DIAGNOSIS — M50.30 DDD (DEGENERATIVE DISC DISEASE), CERVICAL: ICD-10-CM

## 2022-05-23 DIAGNOSIS — M48.02 FORAMINAL STENOSIS OF CERVICAL REGION: ICD-10-CM

## 2022-05-23 RX ORDER — GABAPENTIN 300 MG/1
CAPSULE ORAL
Qty: 120 CAPSULE | Refills: 5 | Status: SHIPPED | OUTPATIENT
Start: 2022-05-23 | End: 2022-05-26

## 2022-05-25 NOTE — PROGRESS NOTES
"Chief Complaint: \"I am doing well.\"    History of Present Illness:   Patient: Mr. Charles Finney, 56 y.o. male originally referred by Dr. Best in consultation for intractable chronic neck and right shoulder pain. He reports a 3-4 year history of pain, which began without incident.  I last spoke with Mr. Finney on May 12, 2021 for follow-up evaluation through telemedicine.  He is currently taking gabapentin 300 mg 4 times daily, he denies side effects and reports some analgesic benefit from medication. Additionally, he reports he has not noticed much numbness in his right extremity and right fingers as before.  He continues to struggle with some neck pain, and more recently he started a more labor-intensive job, which is increased his symptoms.  He returns today for follow-up evaluation.  Pain description: intermittent exacerbation, described as aching and burning sensation.   Radiation of pain: The pain radiates from the neck to the right shoulder, right arm, and right hand with intermittent numbness to the first three digits of his right hand.  He denies left upper extremity symptoms.  Pain intensity today: 0/10   Average pain intensity last week: 3/10   Pain intensity ranges from: 1/10 to 6/10  Aggravating factors: Pain increases with extension, flexion and rotation of the cervical spine, and driving   Alleviating factors: Pain decreases with analgesics, hot shower, massager, and ice.   Associated symptoms:   Patient reports pain, numbness and weakness in the right upper extremity. He denies left upper extremity symptoms  Patient denies any new bladder or bowel problems.   Patient denies difficulties with his balance.   Patient denies headaches.    Review of previous therapies and additional medical records:  Charles Finney has already failed the following measures, including:   Conservative measures: oral analgesics, topical analgesics, physical therapy, ice, heat and home exercises, NSAIDs   Interventional " measures: 10/29/2018: cervical epidural steroid injection at C6-C7 by right interlaminar approach  07/27/2020: SERGIO  Surgical measures: No history of cervical spine surgery.  Charles Finney underwent neurosurgical  consultation with Dr. Best most recently on 10/16/2020, and was found not to be a surgical candidate at this time.  Charles Finney has a history of hypertension and is engaged in treatment.  In terms of current analgesics, Charles Finney takes: gabapentin, and Tylenol and SalonPas  I have reviewed the RODDY Report is consistent to medication reconciliation.    Global Pain Scale 10-11  2018 11-26  2018 05-08  2019  01-08  2020  08-27  2020  05-26  2022       Pain 12 4 6  9  12  5       Feelings 0 0 0  0  0  0       Clinical outcomes 19 0 4  8  12  4       Activities 18 0 0  7  6  9       GPS Total: 49 4 10  24  30  18         Review of Diagnostic Studies:  MRI of the cervical spine without contrast on 10/7/2020: I have reviewed the images.  Mild degenerative spurring throughout.  C2-C3: Mild endplate spurring is present.  No spinal canal or foraminal stenosis is seen.  C3-C4: Mild endplate spurring, with disc bulge no spinal canal or foraminal stenosis is seen.  C4-C5: There is a broad based central disc protrusion, along with mild endplate spurring and mild spinal stenosis.  There is mild bilateral neural foraminal narrowing seen.  C5-C6: Left broad-based disc protrusion is present, creating mild crowding of the left nerve root, with mild spinal canal stenosis and mild bilateral neural foraminal stenosis.  C6-C7: Herniated disc protrusion, creating mild to moderate central spinal canal stenosis, there is mild endplate spurring, and moderate bilateral neuroforaminal stenosis.  C7-T1: Normal.      Review of Systems   Respiratory: Positive for apnea.    Musculoskeletal: Positive for back pain, neck pain and neck stiffness.   Hematological: Bruises/bleeds easily.   All other systems reviewed and are  negative.     Patient Active Problem List   Diagnosis   • Cervical stenosis of spinal canal   • Foraminal stenosis of cervical region   • Cervical spondylosis without myelopathy   • DDD (degenerative disc disease), cervical   • Essential hypertension   • Morbidly obese (HCC)   • Hypercholesterolemia   • Mild intermittent asthma without complication     Past Medical History:   Diagnosis Date   • Asthma    • Back problem    • Cervical disc disorder    • Chronic pain disorder    • Hypertension    • Joint pain    • Low back pain    • Lumbosacral disc disease    • Neck pain    • Umbilical hernia       Past Surgical History:   Procedure Laterality Date   • CARDIAC CATHETERIZATION  04/2022    with stent placement   • CYSTOSCOPY BLADDER STONE LITHOTRIPSY     • KIDNEY STONE SURGERY  01/01/2013    Lithotripsy   • KNEE CARTILAGE SURGERY  01/01/1991   • NASAL SEPTUM SURGERY  05/01/2016   • TONSILLECTOMY     • UMBILICAL HERNIA REPAIR  01/01/1999         Family History   Problem Relation Age of Onset   • Breast cancer Mother    • Diabetes Father    • Kidney failure Father          Social History     Socioeconomic History   • Marital status: Single   Tobacco Use   • Smoking status: Never Smoker   • Smokeless tobacco: Never Used   Substance and Sexual Activity   • Alcohol use: Yes     Comment: Rarely   • Drug use: No        Current Outpatient Medications:   •  albuterol (PROVENTIL HFA;VENTOLIN HFA) 108 (90 BASE) MCG/ACT inhaler, Inhale 2 puffs every 4 (four) hours as needed for wheezing., Disp: , Rfl:   •  atorvastatin (LIPITOR) 40 MG tablet, Take 40 mg by mouth Daily., Disp: , Rfl:   •  Gonzales Low Dose 81 MG EC tablet, Take 81 mg by mouth Daily., Disp: , Rfl:   •  Brilinta 90 MG tablet tablet, Take 90 mg by mouth 2 (Two) Times a Day., Disp: , Rfl:   •  fluticasone (FLONASE) 50 MCG/ACT nasal spray, 2 sprays into each nostril daily., Disp: , Rfl:   •  losartan-hydrochlorothiazide (HYZAAR) 50-12.5 MG per tablet, Take 1 tablet by mouth  "Daily., Disp: , Rfl:   •  metoprolol succinate XL (TOPROL-XL) 25 MG 24 hr tablet, Take 25 mg by mouth Daily., Disp: , Rfl:   •  mometasone-formoterol (Dulera) 200-5 MCG/ACT inhaler, , Disp: , Rfl:   •  Arnuity Ellipta 100 MCG/ACT aerosol powder , Inhale 1 puff 2 (Two) Times a Day., Disp: , Rfl:   •  FLOVENT DISKUS 250 MCG/BLIST aerosol powder , , Disp: , Rfl:   •  gabapentin (NEURONTIN) 600 MG tablet, Take 1 tablet by mouth 3 (Three) Times a Day., Disp: 90 tablet, Rfl: 4  •  losartan (COZAAR) 50 MG tablet, Take 50 mg by mouth Daily., Disp: , Rfl:   •  nortriptyline (PAMELOR) 25 MG capsule, Take 1 capsule by mouth At Night As Needed for Sleep. 1-2 TABLETS AT BEDTIME, Disp: 60 capsule, Rfl: 3  •  pravastatin (PRAVACHOL) 80 MG tablet, , Disp: , Rfl:   •  sodium-potassium-magnesium sulfates (Suprep Bowel Prep Kit) 17.5-3.13-1.6 GM/177ML solution oral solution, Take 2 bottles by mouth Take As Directed. Do Not Eat The Day Before Your Procedure. Call [phone] for questions., Disp: 354 mL, Rfl: 0      Allergies   Allergen Reactions   • Other      Environmental allergies     • Penicillins          /90   Pulse 73   Resp 16   Ht 167.6 cm (66\")   Wt 88.7 kg (195 lb 9.6 oz)   SpO2 98%   BMI 31.57 kg/m²          Physical Exam:   Constitutional: Patient is oriented to person, place, and time. Vital signs are normal. Patient appears well-developed and well-nourished.   HENT: Head: Normocephalic and atraumatic. Eyes: Conjunctivae and lids are normal. Pupils: Equal, round, reactive to light.   Neck: Trachea normal. Neck supple. No JVD present.   Pulmonary Respiratory effort: No increased work of breathing or signs of respiratory distress.  Lymphatic: No cervical adenopathy  Musculoskeletal   Gait and station: Gait evaluation demonstrated a normal gait.   Cervical spine: Passive and active range of motion is somewhat limited without significant pain. Extension, flexion, lateral flexion, rotation of the cervical spine " increased and reproduced minimal pain. Cervical facet joint loading maneuvers are negative.   Muscles: Presence of active trigger points right levator scapulae, right rhomboids    Shoulders: The range of motion of the glenohumeral joints is full and without pain. Rotator cuff strength is 5/5.   Thoracic spine: Thoracic facet joint loading maneuvers are negative.   Neurological:   Patient is alert and oriented to person, place, and time. Speech: speech is normal. Cortical function: Normal mental status.   Cranial nerves: Cranial nerves 2-12 intact.   Reflex Scores:  Right brachioradialis: 1+  Left brachioradialis: 1+  Right biceps: 1+  Left biceps: 1+  Right triceps: 1+  Left triceps: 1+  Motor strength: 5/5, except for 4/5 right bicep  Motor Tone: normal tone.   Involuntary movements: none.   Superficial/Primitive Reflexes: primitive reflexes were absent.   Right Pierre: absent  Left Pierre: absent  Right ankle clonus: absent  Left ankle clonus: absent   Spurling sign is negative today. Lhermitte sign is negative. Neck tornado test is negative. Negative long tract signs.    Sensation: No sensory loss. Sensory exam: intact to light touch, intact pain and temperature sensation, intact vibration sensation and normal proprioception.   Coordination: Normal finger to nose and heel to shin. Normal balance and negative. Romberg's sign negative.   Skin and subcutaneous tissue: Skin is warm and intact. No rash noted. No cyanosis.   Psychiatric: Judgment and insight: Normal.   Orientation to person, place and time: Normal.   Recent and remote memory: Intact.   Mood and affect: Normal.     ASSESSMENT:   1. Foraminal stenosis of cervical region    2. DDD (degenerative disc disease), cervical    3. Cervical spondylosis without myelopathy    4. Mild obesity          PLAN/MEDICAL DECISION MAKING:  I had a lengthy conversation with Mr. Charles Finney regarding his chronic pain condition and potential therapeutic options including  risks, benefits, alternative therapies, to name a few. Patient has failed to obtain pain relief with conservative measures. He was seen on October 16, 2020 by Dr. Best with a new MRI of his cervical spine.  At that time Dr. Best did not find him to be a candidate for surgical intervention, but to continue physical therapy and pain management. MRI of the cervical spine on 10/7/2020, demonstrated mild degenerative spurring throughout, with areas of spinal canal stenosis and mild bilateral neural foraminal stenosis.  At C5-C6 there is a broad-based disc protrusion creating mild crowding of the descending left nerve root.  At C6-C7 there is a central herniated disc protrusion creating mild to moderate central spinal canal stenosis and moderate bilateral neural foraminal stenosis. He reports today he is doing fairly well, he is still noticing some significant symptoms especially throughout the day into the evening.  He recently started a more labor-intensive job, from which he has noticed some increase in his overall neck pain.  We discussed increasing gabapentin, which he has agreed.  He will follow-up with me in 6 months.  We have discussed increasing gabapentin to 300 mg twice daily, and 600 mg at night.  Additionally, he reports he has noticed a decrease in the numbness in his right upper extremity and fingers that was present before.  I have reviewed all available patient's medical records as well as previous therapies as referenced above.  Therefore, I have proposed the following plan:  1. Interventional pain management measures: None indicated at this time. If pain recurs we could repeat cervical epidural steroid injection at C6-C7 by right interlaminar approach, which in that case he will have to come off of his Brilinta. We may repeat another epidural depending on patient's outcome. Patient will follow-up with Dr. Best thereafter.  2. Follow up in 6 months for medication refill or sooner if needed  3.  Long-term rehabilitation efforts:  A. The patient does not have a history of falls. I did complete a risk assessment for falls.   B. Patient will start a comprehensive physical therapy program for upper body strengthening/posture correction, gait and balance training, neurodynamics, E-STIM, myofascial release, cupping and dry needling if pain recurs  C. Start an exercise program such as daily walks for fitness  4. Pharmacological measures:  A. Increase gabapentin to 600 mg 3 times daily.  C. Increase nortriptyline to 25 mg 1-2 tablets at bedtime as needed for insomnia  5. The patient has been instructed to contact my office with any questions or difficulties. The patient understands the plan and agrees to proceed accordingly.             Patient Care Team:  Caridad Kaba MD as PCP - General (Family Medicine)  Rakesh Best MD as Consulting Physician (Neurosurgery)  John Del Castillo MD as Consulting Physician (Cardiology)  Latrell Solis MD as Consulting Physician (Anesthesiology)  Thomas Brar PA-C as Physician Assistant (Physician Assistant)  Stephany Stevenson APRN as Nurse Practitioner (Pain Medicine)       New Medications Ordered This Visit   Medications   • nortriptyline (PAMELOR) 25 MG capsule     Sig: Take 1 capsule by mouth At Night As Needed for Sleep. 1-2 TABLETS AT BEDTIME     Dispense:  60 capsule     Refill:  3   • gabapentin (NEURONTIN) 600 MG tablet     Sig: Take 1 tablet by mouth 3 (Three) Times a Day.     Dispense:  90 tablet     Refill:  4           Future Appointments   Date Time Provider Department Center   5/26/2022  1:30 PM Stephany Stevenson APRN [facility]   11/28/2022 10:00 AM Stephany Stevenson APRN [facility]         MARNIE Jacobson

## 2022-05-26 ENCOUNTER — OFFICE VISIT (OUTPATIENT)
Dept: PAIN MEDICINE | Facility: CLINIC | Age: 57
End: 2022-05-26

## 2022-05-26 VITALS
HEART RATE: 73 BPM | BODY MASS INDEX: 31.43 KG/M2 | OXYGEN SATURATION: 98 % | DIASTOLIC BLOOD PRESSURE: 90 MMHG | SYSTOLIC BLOOD PRESSURE: 130 MMHG | RESPIRATION RATE: 16 BRPM | HEIGHT: 66 IN | WEIGHT: 195.6 LBS

## 2022-05-26 DIAGNOSIS — E66.9 MILD OBESITY: ICD-10-CM

## 2022-05-26 DIAGNOSIS — M48.02 FORAMINAL STENOSIS OF CERVICAL REGION: ICD-10-CM

## 2022-05-26 DIAGNOSIS — M50.30 DDD (DEGENERATIVE DISC DISEASE), CERVICAL: ICD-10-CM

## 2022-05-26 DIAGNOSIS — M47.812 CERVICAL SPONDYLOSIS WITHOUT MYELOPATHY: ICD-10-CM

## 2022-05-26 PROCEDURE — 99213 OFFICE O/P EST LOW 20 MIN: CPT | Performed by: NURSE PRACTITIONER

## 2022-05-26 RX ORDER — METOPROLOL SUCCINATE 25 MG/1
25 TABLET, EXTENDED RELEASE ORAL DAILY
COMMUNITY
Start: 2022-03-05

## 2022-05-26 RX ORDER — ATORVASTATIN CALCIUM 40 MG/1
40 TABLET, FILM COATED ORAL DAILY
COMMUNITY
Start: 2022-04-26

## 2022-05-26 RX ORDER — TICAGRELOR 90 MG/1
90 TABLET ORAL 2 TIMES DAILY
COMMUNITY
Start: 2022-04-04

## 2022-05-26 RX ORDER — MOMETASONE FUROATE AND FORMOTEROL FUMARATE DIHYDRATE 200; 5 UG/1; UG/1
AEROSOL RESPIRATORY (INHALATION)
COMMUNITY
End: 2022-11-29

## 2022-05-26 RX ORDER — GABAPENTIN 600 MG/1
600 TABLET ORAL 3 TIMES DAILY
Qty: 90 TABLET | Refills: 4 | Status: SHIPPED | OUTPATIENT
Start: 2022-05-26 | End: 2022-11-10

## 2022-05-26 RX ORDER — NORTRIPTYLINE HYDROCHLORIDE 25 MG/1
25 CAPSULE ORAL NIGHTLY PRN
Qty: 60 CAPSULE | Refills: 3 | Status: SHIPPED | OUTPATIENT
Start: 2022-05-26 | End: 2023-01-03

## 2022-11-10 DIAGNOSIS — M48.02 FORAMINAL STENOSIS OF CERVICAL REGION: ICD-10-CM

## 2022-11-10 RX ORDER — GABAPENTIN 600 MG/1
TABLET ORAL
Qty: 90 TABLET | Refills: 5 | Status: SHIPPED | OUTPATIENT
Start: 2022-11-10

## 2022-11-29 ENCOUNTER — HOSPITAL ENCOUNTER (OUTPATIENT)
Dept: GENERAL RADIOLOGY | Facility: HOSPITAL | Age: 57
Discharge: HOME | End: 2022-11-29

## 2022-11-29 ENCOUNTER — OFFICE VISIT (OUTPATIENT)
Dept: PAIN MEDICINE | Facility: CLINIC | Age: 57
End: 2022-11-29

## 2022-11-29 VITALS
RESPIRATION RATE: 14 BRPM | HEART RATE: 80 BPM | HEIGHT: 66 IN | TEMPERATURE: 97.3 F | BODY MASS INDEX: 32.54 KG/M2 | DIASTOLIC BLOOD PRESSURE: 90 MMHG | OXYGEN SATURATION: 100 % | SYSTOLIC BLOOD PRESSURE: 140 MMHG | WEIGHT: 202.5 LBS

## 2022-11-29 DIAGNOSIS — M47.812 CERVICAL SPONDYLOSIS WITHOUT MYELOPATHY: ICD-10-CM

## 2022-11-29 DIAGNOSIS — E66.9 MILD OBESITY: ICD-10-CM

## 2022-11-29 DIAGNOSIS — M48.02 FORAMINAL STENOSIS OF CERVICAL REGION: ICD-10-CM

## 2022-11-29 DIAGNOSIS — M50.30 DDD (DEGENERATIVE DISC DISEASE), CERVICAL: ICD-10-CM

## 2022-11-29 DIAGNOSIS — M99.51 INTERVERTEBRAL DISC STENOSIS OF NEURAL CANAL OF CERVICAL REGION: ICD-10-CM

## 2022-11-29 DIAGNOSIS — M47.812 CERVICAL SPONDYLOSIS WITHOUT MYELOPATHY: Primary | ICD-10-CM

## 2022-11-29 PROCEDURE — 99213 OFFICE O/P EST LOW 20 MIN: CPT | Performed by: NURSE PRACTITIONER

## 2022-11-29 PROCEDURE — 72052 X-RAY EXAM NECK SPINE 6/>VWS: CPT

## 2022-11-29 PROCEDURE — 73030 X-RAY EXAM OF SHOULDER: CPT

## 2022-11-29 NOTE — PROGRESS NOTES
"Chief Complaint: \"Neck and shoulder pain.\"    History of Present Illness:   Patient: Mr. Charles Finney, 57 y.o. male originally referred by Dr. Best in consultation for intractable chronic neck and right shoulder pain. He reports a 3-4 year history of pain, which began without incident.  He was last seen on May 26, 2022 for follow-up evaluation. He is currently taking gabapentin 600 mg 3 times daily, he denies side effects and reports some analgesic benefit from medication. Additionally, he reports he has not noticed much numbness in his right extremity and right fingers as before.  He continues to struggle with some neck pain, and more recently began to experience symptoms extending into his left shoulder and left scapular region.  He continues to have right sided symptoms as well. He returns today for follow-up evaluation.  Pain description: Constant pain with intermittent exacerbation, described as aching, \"toothache\" and burning sensation.   Radiation of pain: The pain radiates from the neck to the shoulders and shoulder blades and left scapular region. He does have some right axilla pain. RT>LT.   Pain intensity today: 5/10   Average pain intensity last week: 4/10   Pain intensity ranges from: 2/10 to 7/10  Aggravating factors: Pain increases with extension, flexion and rotation of the cervical spine, and driving   Alleviating factors: Pain decreases with analgesics, hot shower, massager, and ice.   Associated symptoms:   Patient denies persistent pain, numbness and weakness in the upper extremity symptoms  Patient denies any new bladder or bowel problems.   Patient denies difficulties with his balance or recent falls.   Patient denies headaches.  Stiffness    Review of previous therapies and additional medical records:  Charles Finney has already failed the following measures, including:   Conservative measures: oral analgesics, topical analgesics, physical therapy, ice, heat and home exercises, NSAIDs "   Interventional measures: 10/29/2018: cervical epidural steroid injection at C6-C7 by right interlaminar approach  07/27/2020: SERGIO  Surgical measures: No history of cervical spine surgery.  Charles Finney underwent neurosurgical  consultation with Dr. Best most recently on 10/16/2020, and was found not to be a surgical candidate at this time.  Charles Finney has a history of hypertension, cardiac stent on Brilinta and is engaged in treatment.  In terms of current analgesics, Charles Finney takes: gabapentin, and Tylenol and SalonPas  I have reviewed the RODDY Report is consistent to medication reconciliation.    Global Pain Scale 10-11  2018 11-26  2018 05-08  2019  01-08  2020  08-27  2020  05-26  2022  11-29  2022     Pain 12 4 6  9  12  5  11     Feelings 0 0 0  0  0  0  0     Clinical outcomes 19 0 4  8  12  4  12     Activities 18 0 0  7  6  9  13     GPS Total: 49 4 10  24  30  18  36       Review of Diagnostic Studies:  MRI of the cervical spine without contrast on 10/7/2020: I have reviewed the images.  Mild degenerative spurring throughout.  C2-C3: Mild endplate spurring is present.  No spinal canal or foraminal stenosis is seen.  C3-C4: Mild endplate spurring, with disc bulge no spinal canal or foraminal stenosis is seen.  C4-C5: There is a broad based central disc protrusion, along with mild endplate spurring and mild spinal stenosis.  There is mild bilateral neural foraminal narrowing seen.  C5-C6: Left broad-based disc protrusion is present, creating mild crowding of the left nerve root, with mild spinal canal stenosis and mild bilateral neural foraminal stenosis.  C6-C7: Herniated disc protrusion, creating mild to moderate central spinal canal stenosis, there is mild endplate spurring, and moderate bilateral neuroforaminal stenosis.  C7-T1: Normal.      Review of Systems   Musculoskeletal: Positive for back pain, neck pain and neck stiffness.   All other systems reviewed and are negative.      Patient Active Problem List   Diagnosis   • Cervical stenosis of spinal canal   • Foraminal stenosis of cervical region   • Cervical spondylosis without myelopathy   • DDD (degenerative disc disease), cervical   • Essential hypertension   • Morbidly obese (HCC)   • Hypercholesterolemia   • Mild intermittent asthma without complication     Past Medical History:   Diagnosis Date   • Asthma    • Back problem    • Cervical disc disorder    • Chronic pain disorder    • Hypertension    • Joint pain    • Low back pain    • Lumbosacral disc disease    • Neck pain    • Umbilical hernia       Past Surgical History:   Procedure Laterality Date   • CARDIAC CATHETERIZATION  04/2022    with stent placement   • CYSTOSCOPY BLADDER STONE LITHOTRIPSY     • KIDNEY STONE SURGERY  01/01/2013    Lithotripsy   • KNEE CARTILAGE SURGERY  01/01/1991   • NASAL SEPTUM SURGERY  05/01/2016   • TONSILLECTOMY     • UMBILICAL HERNIA REPAIR  01/01/1999         Family History   Problem Relation Age of Onset   • Breast cancer Mother    • Diabetes Father    • Kidney failure Father          Social History     Socioeconomic History   • Marital status: Single   Tobacco Use   • Smoking status: Never   • Smokeless tobacco: Never   Substance and Sexual Activity   • Alcohol use: Yes     Comment: Rarely   • Drug use: No        Current Outpatient Medications:   •  albuterol (PROVENTIL HFA;VENTOLIN HFA) 108 (90 BASE) MCG/ACT inhaler, Inhale 2 puffs every 4 (four) hours as needed for wheezing., Disp: , Rfl:   •  atorvastatin (LIPITOR) 40 MG tablet, Take 40 mg by mouth Daily., Disp: , Rfl:   •  Gonzales Low Dose 81 MG EC tablet, Take 81 mg by mouth Daily., Disp: , Rfl:   •  Brilinta 90 MG tablet tablet, Take 90 mg by mouth 2 (Two) Times a Day., Disp: , Rfl:   •  fluticasone (FLONASE) 50 MCG/ACT nasal spray, 2 sprays into each nostril daily., Disp: , Rfl:   •  gabapentin (NEURONTIN) 600 MG tablet, TAKE 1 TABLET BY MOUTH THREE TIMES DAILY, Disp: 90 tablet, Rfl: 5  •   "losartan-hydrochlorothiazide (HYZAAR) 50-12.5 MG per tablet, Take 1 tablet by mouth Daily., Disp: , Rfl:   •  metoprolol succinate XL (TOPROL-XL) 25 MG 24 hr tablet, Take 25 mg by mouth Daily., Disp: , Rfl:   •  nortriptyline (PAMELOR) 25 MG capsule, Take 1 capsule by mouth At Night As Needed for Sleep. 1-2 TABLETS AT BEDTIME, Disp: 60 capsule, Rfl: 3      Allergies   Allergen Reactions   • Other      Environmental allergies     • Penicillins          /90   Pulse 80   Temp 97.3 °F (36.3 °C)   Resp 14   Ht 167.6 cm (66\")   Wt 91.9 kg (202 lb 8 oz)   SpO2 100%   BMI 32.68 kg/m²          Physical Exam:   Constitutional: Patient is oriented to person, place, and time. Vital signs are normal. Patient appears well-developed and well-nourished.   HENT: Head: Normocephalic and atraumatic. Eyes: Conjunctivae and lids are normal. Pupils: Equal, round, reactive to light.   Neck: Trachea normal. Neck supple. No JVD present.   Pulmonary Respiratory effort: No increased work of breathing or signs of respiratory distress.  Lymphatic: No cervical adenopathy  Musculoskeletal   Gait and station: Gait evaluation demonstrated a normal gait.   Cervical spine: Passive and active range of motion is somewhat limited with pain. Extension, flexion, lateral flexion, rotation of the cervical spine increased and reproduced pain. Cervical facet joint loading maneuvers are eqivocal.   Muscles: Presence of active trigger points right levator scapulae, right rhomboids    Shoulders: The range of motion of the glenohumeral joints is full and without pain. Rotator cuff strength is 5/5.   Thoracic spine: Thoracic facet joint loading maneuvers are negative.   Neurological:   Patient is alert and oriented to person, place, and time. Speech: speech is normal. Cortical function: Normal mental status.   Cranial nerves: Cranial nerves 2-12 intact.   Reflex Scores:  Right brachioradialis: 1+  Left brachioradialis: 1+  Right biceps: 1+  Left " biceps: 1+  Right triceps: 1+  Left triceps: 1+  Motor strength: 5/5  Motor Tone: normal tone.   Involuntary movements: none.   Superficial/Primitive Reflexes: primitive reflexes were absent.   Right Pierre: absent  Left Pierre: absent  Right ankle clonus: absent  Left ankle clonus: absent   Spurling sign is positive. Lhermitte sign is negative. Neck tornado test is negative. Negative long tract signs.    Sensation: No sensory loss. Sensory exam: intact to light touch, intact pain and temperature sensation, intact vibration sensation and normal proprioception.   Coordination: Normal finger to nose and heel to shin. Normal balance and negative. Romberg's sign negative.   Skin and subcutaneous tissue: Skin is warm and intact. No rash noted. No cyanosis.   Psychiatric: Judgment and insight: Normal.  Orientation to person, place and time: Normal. Recent and remote memory: Intact.   Mood and affect: Normal.     ASSESSMENT:   1. Intervertebral disc stenosis of neural canal of cervical region    2. Cervical spondylosis without myelopathy    3. DDD (degenerative disc disease), cervical    4. Foraminal stenosis of cervical region    5. Mild obesity          PLAN/MEDICAL DECISION MAKING: Mr. Charles Finney, 57 y.o. male presents with a 3 to 4-year history of chronic neck pain. He was seen on October 16, 2020 by Dr. Best with a new MRI of his cervical spine.  At that time Dr. Best did not find him to be a candidate for surgical intervention, but to continue physical therapy and pain management. MRI of the cervical spine on 10/7/2020, demonstrated mild degenerative spurring throughout, with areas of spinal canal stenosis and mild bilateral neural foraminal stenosis.  At C5-C6 there is a broad-based disc protrusion creating mild crowding of the descending left nerve root.  At C6-C7 there is a central herniated disc protrusion creating mild to moderate central spinal canal stenosis and moderate bilateral neural foraminal  stenosis. He reports today he still notices significant symptoms especially throughout the day, which are activity based.  He has been experiencing progression of his symptoms now extending into his bilateral shoulders.  The gabapentin does provide some benefit which has helped with the numbness in his right upper extremity.  He does have some elements of mechanical neck pain, but a majority of his symptoms appear to be related to disc disease as well as narrowing of the spinal canal.  He has participated in previous physical therapy, continues daily stretching, use of a TENS unit as well as oral analgesics, without significant long-lasting benefit.  Additionally, due to patient's progression of symptoms I recommended an MRI of the cervical spine without contrast. I had a lengthy conversation with . Charles Finney regarding his chronic pain condition and potential therapeutic options including risks, benefits, alternative therapies, to name a few. Patient has failed to obtain pain relief with conservative measures. I have reviewed all available patient's medical records as well as previous therapies as referenced above.  Therefore, I have proposed the following plan:  1. Interventional pain management measures: Patient will need to stop ticagrelor (Brilinta) at least 7 days between last dose and procedure (we will request clearance from Dr. Bridges with cardiology) in order to proceed with cervical epidural steroid injection at C6-C7 by interlaminar approach. We may repeat another epidural depending on patient's outcome.  Additionally, depending on MRI findings we may consider a transforaminal approach.  I have also discussed with the patient treatment of his mechanical neck pain, 2 sets of diagnostic MBB's and eventually rhizotomy. Patient will follow-up with Dr. Best thereafter.  2. Diagnostics:  A. MRI of the cervical spine without contrast  B. Cervical spine x-rays as well as bilateral shoulder x-rays  3.  Long-term rehabilitation efforts:  A. The patient does not have a history of falls. I did complete a risk assessment for falls.   B. Patient will start a comprehensive physical therapy program for upper body strengthening/posture correction, gait and balance training, neurodynamics, E-STIM, myofascial release, cupping and dry needling   C. Start an exercise program such as daily walks for fitness  4. Pharmacological measures:  A. Continue gabapentin 600 mg 3 times daily.  B. Continue nortriptyline to 25 mg 1-2 tablets at bedtime as needed for insomnia  5. The patient has been instructed to contact my office with any questions or difficulties. The patient understands the plan and agrees to proceed accordingly.      Charles Finney reports a pain score of 5.  Given his pain assessment as noted, treatment options were discussed and the following options were decided upon as a follow-up plan to address the patient's pain: continuation of current treatment plan for pain, home exercises and therapy, steroid injections, use of non-medical modalities (ice, heat, stretching and/or behavior modifications) and updated diagnostics.    Pain Medications             Gonzales Low Dose 81 MG EC tablet Take 81 mg by mouth Daily.    gabapentin (NEURONTIN) 600 MG tablet TAKE 1 TABLET BY MOUTH THREE TIMES DAILY    nortriptyline (PAMELOR) 25 MG capsule Take 1 capsule by mouth At Night As Needed for Sleep. 1-2 TABLETS AT BEDTIME               Patient Care Team:  Caridad Kaba MD as PCP - General (Family Medicine)  Rakesh Best MD as Consulting Physician (Neurosurgery)  John Del Castillo MD as Consulting Physician (Cardiology)  Latrell Solis MD as Consulting Physician (Anesthesiology)  Stephany Stevenson APRN as Nurse Practitioner (Pain Medicine)       No orders of the defined types were placed in this encounter.          No future appointments.      MARNIE Jacobson

## 2022-12-12 DIAGNOSIS — M47.812 CERVICAL SPONDYLOSIS WITHOUT MYELOPATHY: Primary | ICD-10-CM

## 2023-01-03 RX ORDER — NORTRIPTYLINE HYDROCHLORIDE 25 MG/1
CAPSULE ORAL
Qty: 180 CAPSULE | Refills: 0 | Status: SHIPPED | OUTPATIENT
Start: 2023-01-03 | End: 2023-04-04

## 2023-04-04 RX ORDER — NORTRIPTYLINE HYDROCHLORIDE 25 MG/1
CAPSULE ORAL
Qty: 180 CAPSULE | Refills: 0 | Status: SHIPPED | OUTPATIENT
Start: 2023-04-04

## 2023-06-02 DIAGNOSIS — M48.02 FORAMINAL STENOSIS OF CERVICAL REGION: ICD-10-CM

## 2023-06-05 RX ORDER — GABAPENTIN 600 MG/1
TABLET ORAL
Qty: 90 TABLET | Refills: 0 | Status: SHIPPED | OUTPATIENT
Start: 2023-06-05

## 2024-02-13 ENCOUNTER — OFFICE VISIT (OUTPATIENT)
Dept: PAIN MEDICINE | Facility: CLINIC | Age: 59
End: 2024-02-13
Payer: COMMERCIAL

## 2024-02-13 VITALS — BODY MASS INDEX: 34.49 KG/M2 | WEIGHT: 214.6 LBS | HEIGHT: 66 IN

## 2024-02-13 DIAGNOSIS — E66.9 MILD OBESITY: ICD-10-CM

## 2024-02-13 DIAGNOSIS — M99.51 INTERVERTEBRAL DISC STENOSIS OF NEURAL CANAL OF CERVICAL REGION: ICD-10-CM

## 2024-02-13 DIAGNOSIS — M50.30 DDD (DEGENERATIVE DISC DISEASE), CERVICAL: ICD-10-CM

## 2024-02-13 DIAGNOSIS — M47.812 CERVICAL SPONDYLOSIS WITHOUT MYELOPATHY: ICD-10-CM

## 2024-02-13 PROCEDURE — 99214 OFFICE O/P EST MOD 30 MIN: CPT | Performed by: NURSE PRACTITIONER

## 2024-02-13 RX ORDER — IBUPROFEN 800 MG/1
TABLET ORAL
COMMUNITY

## 2024-02-13 RX ORDER — CARVEDILOL 12.5 MG/1
1 TABLET ORAL 2 TIMES DAILY WITH MEALS
COMMUNITY
Start: 2023-01-05

## 2024-02-13 RX ORDER — NITROGLYCERIN 0.4 MG/1
TABLET SUBLINGUAL
COMMUNITY

## 2024-02-13 RX ORDER — IPRATROPIUM BROMIDE 21 UG/1
SPRAY, METERED NASAL
COMMUNITY

## 2024-04-09 ENCOUNTER — TELEPHONE (OUTPATIENT)
Dept: PAIN MEDICINE | Facility: CLINIC | Age: 59
End: 2024-04-09
Payer: COMMERCIAL

## 2024-04-09 DIAGNOSIS — M47.812 CERVICAL SPONDYLOSIS WITHOUT MYELOPATHY: Primary | ICD-10-CM

## 2024-04-09 NOTE — TELEPHONE ENCOUNTER
Caller: Charles Finney    Relationship to patient: Self    Best call back number: [phone] (home)     Patient is needing: PATIENT NEEDS HIS PT REFERRAL TO BE MOVED TO [facility] IN [place], [place].

## 2024-09-30 ENCOUNTER — OFFICE VISIT (OUTPATIENT)
Dept: PAIN MEDICINE | Facility: CLINIC | Age: 59
End: 2024-09-30
Payer: COMMERCIAL

## 2024-09-30 ENCOUNTER — HOSPITAL ENCOUNTER (OUTPATIENT)
Facility: HOSPITAL | Age: 59
Discharge: HOME | End: 2024-09-30
Admitting: NURSE PRACTITIONER
Payer: COMMERCIAL

## 2024-09-30 VITALS — BODY MASS INDEX: 34.86 KG/M2 | WEIGHT: 216.9 LBS | HEART RATE: 102 BPM | OXYGEN SATURATION: 98 % | HEIGHT: 66 IN

## 2024-09-30 DIAGNOSIS — M99.51 INTERVERTEBRAL DISC STENOSIS OF NEURAL CANAL OF CERVICAL REGION: ICD-10-CM

## 2024-09-30 DIAGNOSIS — M50.30 DDD (DEGENERATIVE DISC DISEASE), CERVICAL: ICD-10-CM

## 2024-09-30 DIAGNOSIS — E66.9 MILD OBESITY: ICD-10-CM

## 2024-09-30 DIAGNOSIS — M47.812 CERVICAL SPONDYLOSIS WITHOUT MYELOPATHY: ICD-10-CM

## 2024-09-30 PROCEDURE — 99214 OFFICE O/P EST MOD 30 MIN: CPT | Performed by: NURSE PRACTITIONER

## 2024-09-30 PROCEDURE — 1125F AMNT PAIN NOTED PAIN PRSNT: CPT | Performed by: NURSE PRACTITIONER

## 2024-09-30 PROCEDURE — 72052 X-RAY EXAM NECK SPINE 6/>VWS: CPT

## 2024-09-30 PROCEDURE — 1159F MED LIST DOCD IN RCRD: CPT | Performed by: NURSE PRACTITIONER

## 2024-09-30 PROCEDURE — 1160F RVW MEDS BY RX/DR IN RCRD: CPT | Performed by: NURSE PRACTITIONER

## 2024-09-30 RX ORDER — TIZANIDINE 2 MG/1
2 TABLET ORAL EVERY 8 HOURS PRN
Qty: 90 TABLET | Refills: 0 | Status: SHIPPED | OUTPATIENT
Start: 2024-09-30

## 2024-09-30 RX ORDER — CYANOCOBALAMIN 1000 UG/ML
1000 INJECTION, SOLUTION INTRAMUSCULAR; SUBCUTANEOUS
COMMUNITY
Start: 2024-08-28

## 2024-09-30 RX ORDER — LOSARTAN POTASSIUM AND HYDROCHLOROTHIAZIDE 25; 100 MG/1; MG/1
1 TABLET ORAL DAILY
COMMUNITY
Start: 2024-07-22

## 2024-09-30 RX ORDER — FENOFIBRATE 145 MG/1
145 TABLET, COATED ORAL DAILY
COMMUNITY
Start: 2024-08-28

## 2024-09-30 NOTE — PROGRESS NOTES
"Chief Complaint: \" Neck and right arm pain.\"    History of Present Illness:   Patient: Mr. Charles Finney, 58 y.o. male originally referred by Dr. Best in consultation for intractable chronic neck and right shoulder pain. He reports a several year history of pain, which began without incident.  I last saw him in follow-up consultation on 3/13/2024.  At that point, he continued to experience symptoms in his neck which extends into his shoulders, as well as his axilla region.  Previously due to his symptoms we opted to proceed with a cervical epidural steroid injection, unfortunately his insurance denied this request and required physical therapy.  He participated in about 4 visits of physical therapy at the end of 2022, in the beginning of 2023, which made his pain worse.  He was previously on gabapentin, which he titrated off around last summer.  More recently, he was staying in the hospital with a family member, particularly at night, and due to the sleeping conditions he began to experience severe pain from his neck radiating into his right arm.  He experiences pins-and-needles.  He engaged in a new course of physical therapy beginning in April 2024, and he has continued a daily home exercise regimen from his physical therapist.  Pain description: Constant neck and shoulder pain with intermittent exacerbation, described as aching, \"pins-and-needles\" and burning sensation.   Radiation of pain: The pain radiates from the neck to the shoulders and into the right shoulder blade and into his right arm.    Pain intensity today: 6/10   Average pain intensity last week: 6/10   Pain intensity ranges from: 5/10 to 8/10  Aggravating factors: Pain increases with extension, flexion and rotation of the cervical spine, and driving   Alleviating factors: Pain decreases with analgesics, hot shower, massager, and ice.   Associated symptoms:   Patient reports pain, numbness and weakness in the right upper extremity  Patient denies " any new bladder or bowel problems.   Patient denies difficulties with his balance or recent falls.   Patient denies headaches.  Pain interferes with regular activities, ADLs, and affects patient's quality of life  Stiffness  Pain interferes with sleep    Review of previous therapies and additional medical records:  Charles Finney has already failed the following measures, including:   Conservative measures: oral analgesics, topical analgesics, physical therapy, ice, heat and home exercises, NSAIDs   Interventional measures: 10/29/2018: cervical epidural steroid injection at C6-C7 by right interlaminar approach  07/27/2020: SERGIO  Surgical measures: No history of cervical spine surgery.  Charles Finney underwent neurosurgical  consultation with Dr. Best on 10/16/2020, and was found not to be a surgical candidate at this time.  Charles Finney has a history of hypertension, cardiac stent on Brilinta and is engaged in treatment.  In terms of current analgesics, Charles Finney takes: gabapentin, and Tylenol and SalonPas  I have reviewed the RODDY Report is consistent to medication reconciliation.    Global Pain Scale 10-11  2018 11-26  2018 05-08  2019  01-08  2020  08-27  2020  05-26  2022  11-29  2022  02-13  2024 09-30  2024    Pain 12 4 6  9  12  5  11  10 12    Feelings 0 0 0  0  0  0  0  0 0    Clinical outcomes 19 0 4  8  12  4  12  7 16    Activities 18 0 0  7  6  9  13  7 4    GPS Total: 49 4 10  24  30  18  36  24 32        NECK PAIN DISABILITY INDEX QUESTIONNAIRE  DATE 09-30 2024            Pain intensity  0: No pain  1: Mild  2: Moderate  3: Fairly severe  4: very severe  5: Worst imaginable 2            Personal Care   0: I can look after myself normally without causing extra pain.  1: I can look after myself normally, but it causes extra pain.  2: It is painful to look after myself and I am slow and careful.  3: I need some help, but manage most of my personal care.  4: I need help every day in most  aspects of self-care.  5: I do not get dressed; I wash with difficulty and stay in bed. 1            Lifting  0: I can lift heavy weights without extra pain.  1: I can lift heavy weights, but it gives extra pain.  2: Pain prevents me from lifting heavy weights off the floor but I can manage if they are conveniently positioned, for example, on a table.  3: Pain prevents me from lifting heavy weights, but I can manage light to medium weights if they are conveniently positioned.  4: I can lift very light weights.  5: I cannot lift or carry anything at all. 1            Reading  0: I can read as much as I want to with no pain in my neck.  1: I can read as much as I want to with slight pain in my neck.  2: I can read as much as I want to with moderate pain in my neck.  3: I cannot read as much as I want because of moderate pain in my neck.  4: I cannot read as much as I want because of severe pain in my neck.  5: I cannot read at all. 3            Headaches  0: I have no headaches at all.  1: I have slight headaches which come infrequently.  2: I have moderate headaches which come infrequently.  3: I have moderate headaches which come frequently.  4: I have severe headaches which come frequently.  5: I have headaches almost all the time. 0            Concentration  0: I can concentrate fully when I want to with no difficulty.  1: I can concentrate fully when I want to with slight difficulty.  2: I have a fair degree of difficulty in concentrating when I want to.  3: I have a lot of difficulty in concentrating when I want to.  4: I have a great deal of difficulty in concentrating when I want to.  5: I cannot concentrate at all. 2            Work  0: I can do as much work as I want to.  1: I can only do my usual work, but no more.  2: I can do most of my usual work, but no more.  3: I cannot do my usual work.  4: I can hardly do any work at all.  5: I cannot do any work at all. 2            Driving  0: I can drive my car  without any neck pain.  1: I can drive my car as long as I want with slight pain in my neck.  2: I can drive my car as long as I want with moderate pain in my   neck.  3: I cannot drive my car as long as I want because of moderate pain   in my neck.  4: I can hardly drive at all because of severe pain in my neck.  5: I cannot drive my car at all. 3            Sleeping  0: I have no trouble sleeping.  1: My sleep is slightly disturbed (less than 1 hour sleepless).  2: My sleep is mildly disturbed (1-2 hours sleepless).  3: My sleep is moderately disturbed (2-3 hours sleepless).  4: My sleep is greatly disturbed (3-5 hours sleepless).  5: My sleep is completely disturbed (5-7 hours) 3            Recreation  0: I am able to engage in all of my recreational activities with no neck pain at all.  1: I am able to engage in all of my recreational activities with some pain in my neck.  2: I am able to engage in most, but not all of my recreational activities because of pain in my neck.  3: I am able to engage in a few of my recreational activities because of pain in my neck.  4: I can hardly do any recreational activities because of pain in my neck.  5: I cannot do any recreational activities at all. 4            TOTAL SCORE 21             Review of Diagnostic Studies:  Cervical spine x-rays with flexion-extension views 11/29/2022: Moderate posterior facet arthritis, no evidence of instability.  Bilateral shoulder x-rays 11/29/2022: Moderate glenohumeral osteoarthritis seen within the left shoulder joint  MRI of the cervical spine without contrast on 10/7/2020: I have reviewed the images.  Mild degenerative spurring throughout.  C2-C3: Mild endplate spurring is present.  No spinal canal or foraminal stenosis is seen.  C3-C4: Mild endplate spurring, with disc bulge no spinal canal or foraminal stenosis is seen.  C4-C5: There is a broad based central disc protrusion, along with mild endplate spurring and mild spinal stenosis.   There is mild bilateral neural foraminal narrowing seen.  C5-C6: Left broad-based disc protrusion is present, creating mild crowding of the left nerve root, with mild spinal canal stenosis and mild bilateral neural foraminal stenosis.  C6-C7: Herniated disc protrusion, creating mild to moderate central spinal canal stenosis, there is mild endplate spurring, and moderate bilateral neuroforaminal stenosis.  C7-T1: Normal.      Review of Systems   Musculoskeletal:  Positive for arthralgias, back pain, neck pain and neck stiffness.   Allergic/Immunologic: Positive for environmental allergies and food allergies.   All other systems reviewed and are negative.     Patient Active Problem List   Diagnosis    Cervical stenosis of spinal canal    Foraminal stenosis of cervical region    Cervical spondylosis without myelopathy    DDD (degenerative disc disease), cervical    Essential hypertension    Morbidly obese    Hypercholesterolemia    Mild intermittent asthma without complication     Past Medical History:   Diagnosis Date    Asthma     Back problem     Cervical disc disorder     Chronic pain disorder     Extremity pain Sept 30 2018    Neck and shoulder spreading all the way to hand.    Hypertension     Joint pain     Low back pain     Lumbosacral disc disease     Neck pain     Spinal stenosis     Umbilical hernia       Past Surgical History:   Procedure Laterality Date    CARDIAC CATHETERIZATION  04/2022    with stent placement    CYSTOSCOPY BLADDER STONE LITHOTRIPSY      KIDNEY STONE SURGERY  01/01/2013    Lithotripsy    KNEE CARTILAGE SURGERY  01/01/1991    NASAL SEPTUM SURGERY  05/01/2016    ORTHOPEDIC SURGERY  1990    Knee scope    TONSILLECTOMY      UMBILICAL HERNIA REPAIR  01/01/1999         Family History   Problem Relation Age of Onset    Breast cancer Mother     Cancer Mother         Breast cancer    Early death Mother         Breast cancer    Diabetes Father     Kidney failure Father     COPD Father     Stroke  Father         Nov 2016    Diabetes Brother     Early death Brother         Diabetes complications         Social History     Socioeconomic History    Marital status: Single   Tobacco Use    Smoking status: Never    Smokeless tobacco: Never   Substance and Sexual Activity    Alcohol use: Yes     Comment: Rarely    Drug use: Never    Sexual activity: Not Currently     Partners: Female     Birth control/protection: Condom     Comment: Not currently involved        Current Outpatient Medications:     albuterol (PROVENTIL HFA;VENTOLIN HFA) 108 (90 BASE) MCG/ACT inhaler, Inhale 2 puffs Every 4 (Four) Hours As Needed for Wheezing., Disp: , Rfl:     atorvastatin (LIPITOR) 40 MG tablet, Take 1 tablet by mouth Daily., Disp: , Rfl:     Gonzales Low Dose 81 MG EC tablet, Take 1 tablet by mouth Daily., Disp: , Rfl:     Brilinta 90 MG tablet tablet, Take 1 tablet by mouth 2 (Two) Times a Day., Disp: , Rfl:     carvedilol (COREG) 12.5 MG tablet, Take 1 tablet by mouth 2 (Two) Times a Day With Meals., Disp: , Rfl:     cyanocobalamin 1000 MCG/ML injection, Inject 1 mL into the appropriate muscle as directed by prescriber Every 30 (Thirty) Days., Disp: , Rfl:     fenofibrate (TRICOR) 145 MG tablet, Take 1 tablet by mouth Daily., Disp: , Rfl:     fluticasone (FLONASE) 50 MCG/ACT nasal spray, Administer 2 sprays into the nostril(s) as directed by provider Daily., Disp: , Rfl:     ibuprofen (ADVIL,MOTRIN) 800 MG tablet, TAKE 1 TABLET BY MOUTH EVERY 8 HOURS WITH FOOD AS NEEDED, Disp: , Rfl:     ipratropium (ATROVENT) 0.03 % nasal spray, USE 2 SPRAY(S) IN EACH NOSTRIL THREE TIMES DAILY, Disp: , Rfl:     losartan-hydrochlorothiazide (HYZAAR) 100-25 MG per tablet, Take 1 tablet by mouth Daily., Disp: , Rfl:     nitroglycerin (NITROSTAT) 0.4 MG SL tablet, PLACE 1 TABLET UNDER THE TONGUE AS NEEDED AS DIRECTED, Disp: , Rfl:     nortriptyline (PAMELOR) 25 MG capsule, TAKE 1 TO 2 CAPSULES BY MOUTH AT BEDTIME AS NEEDED FOR SLEEP, Disp: 180 capsule,  "Rfl: 0    silver sulfadiazine (SSD) 1 % cream, APPLY CREAM EXTERNALLY TO AFFECTED AREA TWICE DAILY, Disp: , Rfl:     tiZANidine (ZANAFLEX) 2 MG tablet, Take 1 tablet by mouth Every 8 (Eight) Hours As Needed for Muscle Spasms. Start 1/2 tab QHS, then continue 1/2 tab to 1 tab TID PRN muscle spasms, Disp: 90 tablet, Rfl: 0      Allergies   Allergen Reactions    Other      Environmental allergies      Penicillins Unknown - Low Severity and Unknown (See Comments)         Pulse 102   Ht 167.6 cm (66\")   Wt 98.4 kg (216 lb 14.4 oz)   SpO2 98%   BMI 35.01 kg/m²          Physical Exam:   Constitutional: Patient is oriented to person, place, and time. Vital signs are normal. Patient appears well-developed and well-nourished.   HENT: Head: Normocephalic and atraumatic. Eyes: Conjunctivae and lids are normal. Pupils: Equal, round, reactive to light.   Neck: Trachea normal. Neck supple. No JVD present.   Pulmonary Respiratory effort: No increased work of breathing or signs of respiratory distress.  Lymphatic: No cervical adenopathy  Musculoskeletal   Gait and station: Gait evaluation demonstrated a normal gait.   Cervical spine: Passive and active range of motion are limited secondary to pain. Extension, flexion, lateral flexion, rotation of the cervical spine increased and reproduced pain. Cervical facet joint loading maneuvers are positive.   Muscles: Presence of active trigger points: None   Shoulders: The range of motion of the glenohumeral joints is somewhat limited with pain. Rotator cuff strength is 5/5.   Neurological:   Patient is alert and oriented to person, place, and time. Speech: speech is normal. Cortical function: Normal mental status.   Cranial nerves: Cranial nerves 2-12 intact.   Reflex Scores:  Right brachioradialis: 1+  Left brachioradialis: 1+  Right biceps: 1+  Left biceps: 1+  Right triceps: 1+  Left triceps: 1+  Motor strength: 5/5  Motor Tone: normal tone.   Involuntary movements: none. "   Superficial/Primitive Reflexes: primitive reflexes were absent.   Right Pierre: absent  Left Pierre: absent  Right ankle clonus: absent  Left ankle clonus: absent   Spurling sign is positive. Lhermitte sign is negative. Neck tornado test is negative. Negative long tract signs.    Sensation: No sensory loss. Sensory exam: intact to light touch, intact pain and temperature sensation, intact vibration sensation and normal proprioception.   Coordination: Normal finger to nose and heel to shin. Normal balance and negative. Romberg's sign negative.   Skin and subcutaneous tissue: Skin is warm and intact. No rash noted. No cyanosis.   Psychiatric: Judgment and insight: Normal.  Orientation to person, place and time: Normal. Recent and remote memory: Intact.   Mood and affect: Normal.     ASSESSMENT:   1. Cervical spondylosis without myelopathy    2. Intervertebral disc stenosis of neural canal of cervical region    3. DDD (degenerative disc disease), cervical    4. Mild obesity              PLAN/MEDICAL DECISION MAKING: Mr. Charles Finney, 58 y.o. male presents with a several year history of chronic neck pain. He was seen on October 16, 2020 by Dr. Best with a new MRI of his cervical spine.  At that time Dr. Best did not find him to be a candidate for surgical intervention, but to continue physical therapy and pain management. MRI of the cervical spine on 10/7/2020, demonstrated mild degenerative spurring throughout, with areas of spinal canal stenosis and mild bilateral neural foraminal stenosis.  At C5-C6 there is a broad-based disc protrusion creating mild crowding of the descending left nerve root.  At C6-C7 there is a central herniated disc protrusion creating mild to moderate central spinal canal stenosis and moderate bilateral neural foraminal stenosis.  He has participated in previous physical therapy, continues daily stretching, use of a TENS unit as well as oral analgesics, without significant  long-lasting benefit.  Additionally, due to patient's progression of symptoms I recommended an MRI of the cervical spine without contrast, which was also denied by insurance. I last saw him in follow-up consultation on 3/13/2024.  At that point, he continued to experience symptoms in his neck which extends into his shoulders, as well as his axilla region.  Previously due to his symptoms we opted to proceed with a cervical epidural steroid injection, unfortunately his insurance denied this request and required physical therapy.  He participated in about 4 visits of physical therapy at the end of 2022, in the beginning of 2023, which made his pain worse.  He was previously on gabapentin, which he titrated off around last summer.  More recently, he was staying in the hospital with a family member, particularly at night, and due to the sleeping conditions he began to experience severe pain from his neck radiating into his right arm.  He experiences pins-and-needles.  He engaged in a new course of physical therapy beginning in April 2024, and he has continued a daily home exercise regimen from his physical therapist.  Due to patient's symptoms of cervical radiculopathy, I have recommended an MRI of the cervical spine.  Further recommendations will ensue thereafter.  I had a lengthy conversation with Mr. Charles Finney regarding his chronic pain condition and potential therapeutic options including risks, benefits, alternative therapies, to name a few. Patient has failed to obtain pain relief with conservative measures. I have reviewed all available patient's medical records as well as previous therapies as referenced above.  Therefore, I have proposed the following plan:  1. Interventional pain management measures: After diagnostic studies, I will call the patient and further recommendations will ensue.  We have already discussed the possibility of patient will be scheduled for diagnostic bilateral cervical medial branch  blocks at C4, C5, C6; for bilateral cervical facet joints at C4-C5, C5-C6, to clarify the origin of chronic refractory mechanical/axial cervicalgia. If patient experiences more than 80% pain relief along with significant improvement in the range of motion of the cervical spine, then, patient will be scheduled for a second set of diagnostic bilateral cervical medial branch blocks, to then, proceed with bilateral cervical medial branch rhizotomies Patient will need to stop ticagrelor (Brilinta) at least 7 days between last dose and procedure (we will request clearance from Dr. Bridges with cardiology) in order to proceed with cervical epidural steroid injection at C6-C7 by interlaminar approach. We may repeat another epidural depending on patient's outcome.    2. Diagnostics: If he continues to struggle we my consider:  A. MRI of the cervical spine without contrast  B. Cervical spine x-rays as well as bilateral shoulder x-rays  3. Long-term rehabilitation efforts:  A. The patient does not have a history of falls. I did complete a risk assessment for falls.   B. Patient will start a comprehensive physical therapy program for upper body strengthening/posture correction, gait and balance training, neurodynamics, E-STIM, myofascial release, cupping and dry needling   C. Start an exercise program such as daily walks for fitness  4. Pharmacological measures.  Patient takes:  A. Continue nortriptyline to 25 mg 1-2 tablets at bedtime as needed for insomnia  5. The patient has been instructed to contact my office with any questions or difficulties. The patient understands the plan and agrees to proceed accordingly.      Charles Finney reports a pain score of 6.  Given his pain assessment as noted, treatment options were discussed and the following options were decided upon as a follow-up plan to address the patient's pain: continuation of current treatment plan for pain, home exercises and therapy, referral to Physical  Therapy, use of non-medical modalities (ice, heat, stretching and/or behavior modifications), and updated diagnostics .    Pain Medications               Gonzales Low Dose 81 MG EC tablet Take 1 tablet by mouth Daily.    ibuprofen (ADVIL,MOTRIN) 800 MG tablet TAKE 1 TABLET BY MOUTH EVERY 8 HOURS WITH FOOD AS NEEDED    nortriptyline (PAMELOR) 25 MG capsule TAKE 1 TO 2 CAPSULES BY MOUTH AT BEDTIME AS NEEDED FOR SLEEP    tiZANidine (ZANAFLEX) 2 MG tablet Take 1 tablet by mouth Every 8 (Eight) Hours As Needed for Muscle Spasms. Start 1/2 tab QHS, then continue 1/2 tab to 1 tab TID PRN muscle spasms                 Patient Care Team:  Caridad Kaba MD as PCP - General (Family Medicine)  Rakesh Best MD as Consulting Physician (Neurosurgery)  John Del Castillo MD as Consulting Physician (Cardiology)  Latrell Solis MD as Consulting Physician (Anesthesiology)  Stephany Stevenson APRN as Nurse Practitioner (Pain Medicine)       New Medications Ordered This Visit   Medications    tiZANidine (ZANAFLEX) 2 MG tablet     Sig: Take 1 tablet by mouth Every 8 (Eight) Hours As Needed for Muscle Spasms. Start 1/2 tab QHS, then continue 1/2 tab to 1 tab TID PRN muscle spasms     Dispense:  90 tablet     Refill:  0           No future appointments.        MARNIE Jacobson

## 2024-10-16 ENCOUNTER — TELEPHONE (OUTPATIENT)
Dept: PAIN MEDICINE | Facility: CLINIC | Age: 59
End: 2024-10-16
Payer: COMMERCIAL

## 2024-10-16 NOTE — TELEPHONE ENCOUNTER
Caller: Charles Finney    Relationship to patient: Self    Best call back number: [phone]    Patient is needing: PATIENT STATES MRI WAS DENIED AND WOULD LIKE TO KNOW WHAT THE NEXT STEPS ARE GOING TO BE

## 2024-10-18 NOTE — TELEPHONE ENCOUNTER
"Called patient back to inform him that he should be receiving a phone call with a reschedule for that Cervical MRI. He stated \"Someone gave me a call and I'm scheduled for the 27th.\"    No further questions  "

## 2024-10-27 ENCOUNTER — HOSPITAL ENCOUNTER (OUTPATIENT)
Facility: HOSPITAL | Age: 59
Discharge: HOME | End: 2024-10-27
Admitting: NURSE PRACTITIONER
Payer: COMMERCIAL

## 2024-10-27 PROCEDURE — 72141 MRI NECK SPINE W/O DYE: CPT

## 2024-10-31 ENCOUNTER — PATIENT MESSAGE (OUTPATIENT)
Dept: PAIN MEDICINE | Facility: CLINIC | Age: 59
End: 2024-10-31
Payer: COMMERCIAL

## 2024-11-07 NOTE — PROGRESS NOTES
[facility] Cardiology    Encounter Date: 2024    Patient ID: Charles Finney is a 59 y.o. male.  : 1965     PCP: Caridad Kaba MD       Chief Complaint: Coronary Artery Disease      PROBLEM LIST:  Coronary artery disease.  [facility]  with nonobstructive CAD in the LAD.  There was a thrombus in the LAD treated with anticoagulation.  No stent or balloon angioplasty.  [facility] 2022: LAD has mid 70% stenosis and distal 50% stenosis.  LCx has 40% stenosis in OM 2.  IFR to LAD was abnormal and was stented with 3.0 x 38 mm SHANA.  IFR to OM was normal.    MPS 5/3/2023: Normal Soha nuclear stress.  Baseline abnormal EKG.  Heart murmur   Hypertension  Dyslipidemia  Chronic pain: neck and back.      History of Present Illness: Charles Finney is a 59 y.o. male who presents to the cardiology office today be seen in consultation for coronary artery disease and to establish care.  The patient has multiple cardiac risk factors and history of CAD with LAD stent.  He had a stress test last year which was unremarkable however we could not find any documentation about his EF.  The patient has recently relocated to [facility] and establish care with a new PCP.  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  He has no current complaints of chest pain shortness of breath edema orthopnea PND or syncope.  He recalls an episode of experiencing palpitations while sitting eating dinner which is associated with dizziness and resolved spontaneously.  No other complaints.  All other review of systems are negative.    Past Medical History:   Past Medical History:   Diagnosis Date   • Asthma    • Back problem    • Cervical disc disorder    • Chronic pain disorder    • Extremity pain 2018    Neck and shoulder spreading all the way to hand.   • Hypertension    • Joint pain    • Low back pain    • Lumbosacral disc disease    • Neck pain    • Spinal  stenosis    • Umbilical hernia        Past Surgical History:   Past Surgical History:   Procedure Laterality Date   • CARDIAC CATHETERIZATION  04/2022    with stent placement   • CYSTOSCOPY BLADDER STONE LITHOTRIPSY     • KIDNEY STONE SURGERY  01/01/2013    Lithotripsy   • KNEE CARTILAGE SURGERY  01/01/1991   • NASAL SEPTUM SURGERY  05/01/2016   • ORTHOPEDIC SURGERY  1990    Knee scope   • TONSILLECTOMY     • UMBILICAL HERNIA REPAIR  01/01/1999       Family History:   Family History   Problem Relation Age of Onset   • Breast cancer Mother    • Cancer Mother         Breast cancer   • Early death Mother         Breast cancer   • Diabetes Father    • Kidney failure Father    • COPD Father    • Stroke Father         Nov 2016   • Diabetes Brother    • Early death Brother         Diabetes complications       Social History:   Social History     Socioeconomic History   • Marital status: Single   Tobacco Use   • Smoking status: Never   • Smokeless tobacco: Never   Vaping Use   • Vaping status: Never Used   • Passive vaping exposure: Yes   Substance and Sexual Activity   • Alcohol use: Yes     Comment: Rarely   • Drug use: Never   • Sexual activity: Not Currently     Partners: Female     Birth control/protection: Condom     Comment: Not currently involved       Tobacco History:   Social History     Tobacco Use   Smoking Status Never   Smokeless Tobacco Never       Medications:     Current Outpatient Medications:   •  albuterol (PROVENTIL HFA;VENTOLIN HFA) 108 (90 BASE) MCG/ACT inhaler, Inhale 2 puffs Every 4 (Four) Hours As Needed for Wheezing., Disp: , Rfl:   •  atorvastatin (LIPITOR) 40 MG tablet, Take 1 tablet by mouth Daily., Disp: , Rfl:   •  Gonzales Low Dose 81 MG EC tablet, Take 1 tablet by mouth Daily., Disp: , Rfl:   •  Brilinta 90 MG tablet tablet, Take 1 tablet by mouth 2 (Two) Times a Day., Disp: , Rfl:   •  carvedilol (COREG) 12.5 MG tablet, Take 1 tablet by mouth 2 (Two) Times a Day With Meals., Disp: , Rfl:   •   "cyanocobalamin 1000 MCG/ML injection, Inject 1 mL into the appropriate muscle as directed by prescriber Every 30 (Thirty) Days., Disp: , Rfl:   •  fenofibrate (TRICOR) 145 MG tablet, Take 1 tablet by mouth Daily., Disp: , Rfl:   •  fluticasone (FLONASE) 50 MCG/ACT nasal spray, Administer 2 sprays into the nostril(s) as directed by provider Daily., Disp: , Rfl:   •  folic acid (FOLVITE) 1 MG tablet, TAKE 1 TABLET BY MOUTH ONCE DAILY . DO NOT EXCEED 1 PER 24 HOURS, Disp: , Rfl:   •  ibuprofen (ADVIL,MOTRIN) 800 MG tablet, TAKE 1 TABLET BY MOUTH EVERY 8 HOURS WITH FOOD AS NEEDED, Disp: , Rfl:   •  ipratropium (ATROVENT) 0.03 % nasal spray, USE 2 SPRAY(S) IN EACH NOSTRIL THREE TIMES DAILY, Disp: , Rfl:   •  losartan-hydrochlorothiazide (HYZAAR) 100-25 MG per tablet, Take 1 tablet by mouth Daily., Disp: , Rfl:   •  nitroglycerin (NITROSTAT) 0.4 MG SL tablet, PLACE 1 TABLET UNDER THE TONGUE AS NEEDED AS DIRECTED, Disp: , Rfl:   •  nortriptyline (PAMELOR) 25 MG capsule, TAKE 1 TO 2 CAPSULES BY MOUTH AT BEDTIME AS NEEDED FOR SLEEP, Disp: 180 capsule, Rfl: 0  •  tiZANidine (ZANAFLEX) 2 MG tablet, Take 1 tablet by mouth Every 8 (Eight) Hours As Needed for Muscle Spasms. Start 1/2 tab QHS, then continue 1/2 tab to 1 tab TID PRN muscle spasms, Disp: 90 tablet, Rfl: 0    Allergies:   Allergies   Allergen Reactions   • Other      Environmental allergies     • Penicillins Unknown - Low Severity and Unknown (See Comments)     The following portions of the patient's history were reviewed and updated as appropriate: allergies, current medications, past family history, past medical history, past social history, past surgical history and problem list.    Review of Systems   12 point ROS negative except for that listed in the HPI.         Objective:     BP 98/64 (BP Location: Left arm, Patient Position: Sitting)   Pulse 78   Ht 167.6 cm (66\")   Wt 97.1 kg (214 lb)   SpO2 96%   BMI 34.54 kg/m²      Physical Exam  Constitutional: " Patient appears well-developed and well-nourished.   HENT: HEENT exam unremarkable.   Neck: Neck supple. No JVD present. No carotid bruits.   Cardiovascular: Normal rate, regular rhythm and normal heart sounds.  2/6 systolic ejection murmur  2+ symmetric pulses.   Pulmonary/Chest: Breath sounds normal. Does not exhibit tenderness.   Abdominal: Abdomen benign.   Musculoskeletal: Does not exhibit edema.   Neurological: Neurological exam unremarkable.   Vitals reviewed.    Data Review:   Lipid panel July 29, 2024: Total cholesterol 141 triglycerides 405 HDL 27 LDL 52       ECG 12 Lead    Date/Time: 11/8/2024 10:05 AM  Performed by: John Del Castillo MD    Authorized by: John Del Castillo MD  Comparison: not compared with previous ECG   Previous ECG: no previous ECG available  Rhythm: sinus rhythm    Clinical impression: non-specific ECG  Comments: Nonspecific ST changes.             Advance Care Planning   ACP discussion was declined by the patient. Patient does not have an advance directive, declines further assistance.       Assessment:      Diagnosis Plan   1. Palpitations  72-hour Holter for further assessment      2. Heart murmur  Echocardiogram for further assessment.      3. Coronary artery disease involving native coronary artery of native heart without angina pectoris  Stable, continue current dual antiplatelet therapy and rest of the current GDMT.      4. Essential hypertension  Well-controlled, continue current antihypertensive therapy.      5. Dyslipidemia  LDL is on target however triglycerides were high, PCP has recently added fenofibrate.  He will need repeat labs in 2 to 3 months and further adjustment of therapy.        Plan:   Assessment and recommendations as above.  Echocardiogram and cardiac monitor for further assessment.  Continue current medications.   FU in 3 MO, sooner as needed.  Thank you for allowing us to participate in the care of your patient.     John Del Castillo MD, FAC, [facility]      Part of this  note may be an electronic transcription/translation of spoken language to printed text using the Dragon Dictation System.

## 2024-11-08 ENCOUNTER — OFFICE VISIT (OUTPATIENT)
Dept: CARDIOLOGY | Facility: CLINIC | Age: 59
End: 2024-11-08
Payer: COMMERCIAL

## 2024-11-08 VITALS
SYSTOLIC BLOOD PRESSURE: 98 MMHG | BODY MASS INDEX: 34.39 KG/M2 | HEART RATE: 78 BPM | HEIGHT: 66 IN | OXYGEN SATURATION: 96 % | DIASTOLIC BLOOD PRESSURE: 64 MMHG | WEIGHT: 214 LBS

## 2024-11-08 DIAGNOSIS — E78.5 DYSLIPIDEMIA: ICD-10-CM

## 2024-11-08 DIAGNOSIS — R01.1 HEART MURMUR: ICD-10-CM

## 2024-11-08 DIAGNOSIS — I25.10 CORONARY ARTERY DISEASE INVOLVING NATIVE CORONARY ARTERY OF NATIVE HEART WITHOUT ANGINA PECTORIS: ICD-10-CM

## 2024-11-08 DIAGNOSIS — R00.2 PALPITATIONS: Primary | ICD-10-CM

## 2024-11-08 DIAGNOSIS — I10 ESSENTIAL HYPERTENSION: ICD-10-CM

## 2024-11-08 RX ORDER — FOLIC ACID 1 MG/1
TABLET ORAL
COMMUNITY

## 2024-11-11 DIAGNOSIS — M47.812 CERVICAL SPONDYLOSIS WITHOUT MYELOPATHY: Primary | ICD-10-CM

## 2024-11-14 ENCOUNTER — TELEPHONE (OUTPATIENT)
Dept: PAIN MEDICINE | Facility: CLINIC | Age: 59
End: 2024-11-14
Payer: COMMERCIAL

## 2024-11-14 NOTE — TELEPHONE ENCOUNTER
Provider:  DR. RANI ARECHIGA    Caller: CALLUM BROWNLEE    Relationship to Patient: SELF        Phone Number: [phone]    Reason for Call:  PATIENT IS SCHEDULED FOR AN OUT-PATIENT PROCEDURE ON 11/27/24 AND PATIENT HAS SOME QUESTIONS HE WOULD LIKE TO DISCUSS.    When was the patient last seen: 9/30/24

## 2024-11-18 NOTE — TELEPHONE ENCOUNTER
Called patient back and answered questions he had about his procedure. Patient was also requesting an arrival time and I advised patient that the surgery center will call the day before his procedure with an arrival time. I provided patient with surgery center number.     Patient was understanding and had no further questions.

## 2024-12-20 ENCOUNTER — TELEPHONE (OUTPATIENT)
Dept: PAIN MEDICINE | Facility: CLINIC | Age: 59
End: 2024-12-20
Payer: COMMERCIAL

## 2024-12-20 NOTE — TELEPHONE ENCOUNTER
Spoke with pt and he doesn't have transportation from [place] to be able to get a procedure done with us in [place]. I provided the pt my desk number and advised he can call me when things change. He advised he may come see Stephany and see what alternatives he can do to getting procedure. No further needs expressed .

## 2025-02-03 PROBLEM — R00.2 PALPITATIONS: Status: ACTIVE | Noted: 2025-02-03

## 2025-02-03 NOTE — PROGRESS NOTES
[facility] Cardiology    Encounter Date: 2025    Patient ID: Charles Finney is a 59 y.o. male.  : 1965     PCP: Caridad Kaba MD       Chief Complaint: Palpitations      PROBLEM LIST:  Coronary artery disease.  [facility]  with nonobstructive CAD in the LAD.  There was a thrombus in the LAD treated with anticoagulation.  No stent or balloon angioplasty.  [facility] 2022: LAD has mid 70% stenosis and distal 50% stenosis.  LCx has 40% stenosis in OM 2.  IFR to LAD was abnormal and was stented with 3.0 x 38 mm SHANA.  IFR to OM was normal.    MPS 5/3/2023: Normal Soha nuclear stress.  Baseline abnormal EKG.  Heart murmur   Palpitations  Holter, 2024: SR with average rate 77/min. Rare PVCs/PACs. No significant arrhythmias.   Hypertension  Dyslipidemia  Chronic pain: neck and back.  REBECA: on Cpap    History of Present Illness  Patient presents today for a 3 month follow-up with a history of CAD and cardiac risk factors. Since last visit, the patient has done well from cardiac standpoint.  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  Gets plenty of exercise.  Continues to experience rare palpitations described as skipped beats however these do not limit his normal life.  No complaints of chest pain or shortness of breath.  No edema orthopnea dizziness lightheadedness or syncope.  States that he has been compliant with his CPAP and tries to limit his caffeine consumption.  At last visit we ordered an echo which was not approved by his insurance.    Allergies   Allergen Reactions    Other      Environmental allergies      Penicillins Unknown - Low Severity and Unknown (See Comments)         Current Outpatient Medications:     albuterol (PROVENTIL HFA;VENTOLIN HFA) 108 (90 BASE) MCG/ACT inhaler, Inhale 2 puffs Every 4 (Four) Hours As Needed for Wheezing., Disp: , Rfl:     atorvastatin (LIPITOR) 40 MG tablet, Take 1 tablet by mouth Daily., Disp: , Rfl:     Gonzales Low  Dose 81 MG EC tablet, Take 1 tablet by mouth Daily., Disp: , Rfl:     Brilinta 90 MG tablet tablet, Take 1 tablet by mouth 2 (Two) Times a Day., Disp: , Rfl:     carvedilol (COREG) 12.5 MG tablet, Take 1 tablet by mouth 2 (Two) Times a Day With Meals., Disp: , Rfl:     cyanocobalamin 1000 MCG/ML injection, Inject 1 mL into the appropriate muscle as directed by prescriber Every 30 (Thirty) Days., Disp: , Rfl:     fenofibrate (TRICOR) 145 MG tablet, Take 1 tablet by mouth Daily., Disp: , Rfl:     fluticasone (FLONASE) 50 MCG/ACT nasal spray, Administer 2 sprays into the nostril(s) as directed by provider Daily., Disp: , Rfl:     fluticasone (FLOVENT HFA) 110 MCG/ACT inhaler, Inhale 2 puffs 2 (Two) Times a Day., Disp: , Rfl:     folic acid (FOLVITE) 1 MG tablet, TAKE 1 TABLET BY MOUTH ONCE DAILY . DO NOT EXCEED 1 PER 24 HOURS, Disp: , Rfl:     ibuprofen (ADVIL,MOTRIN) 800 MG tablet, TAKE 1 TABLET BY MOUTH EVERY 8 HOURS WITH FOOD AS NEEDED, Disp: , Rfl:     ipratropium (ATROVENT) 0.03 % nasal spray, USE 2 SPRAY(S) IN EACH NOSTRIL THREE TIMES DAILY, Disp: , Rfl:     losartan-hydrochlorothiazide (HYZAAR) 100-25 MG per tablet, Take 1 tablet by mouth Daily., Disp: , Rfl:     nitroglycerin (NITROSTAT) 0.4 MG SL tablet, PLACE 1 TABLET UNDER THE TONGUE AS NEEDED AS DIRECTED, Disp: , Rfl:     nortriptyline (PAMELOR) 25 MG capsule, TAKE 1 TO 2 CAPSULES BY MOUTH AT BEDTIME AS NEEDED FOR SLEEP, Disp: 180 capsule, Rfl: 0    tiZANidine (ZANAFLEX) 2 MG tablet, Take 1 tablet by mouth Every 8 (Eight) Hours As Needed for Muscle Spasms. Start 1/2 tab QHS, then continue 1/2 tab to 1 tab TID PRN muscle spasms, Disp: 90 tablet, Rfl: 0    The following portions of the patient's history were reviewed and updated as appropriate: allergies, current medications, past family history, past medical history, past social history, past surgical history and problem list.    ROS  Review of Systems   14 point ROS negative except for that listed in the  "HPI.         Objective:     /84   Pulse 99   Ht 167.6 cm (66\")   Wt 100 kg (221 lb 3.2 oz)   SpO2 96%   BMI 35.70 kg/m²      Physical Exam  General: No apparent distress.  Neck: no JVD.  Chest:No respiratory distress, breath sounds are normal. No wheezes,  rhonchi or rales.  Cardiovascular: Normal S1 and S2, 2/6 systolic murmur.  Extremities: No edema.      Data Review:   Lab date: 10/23/2024  FLP: , , HDL 27, LDL 52  CMP: Glu 106, BUN 15, Creat 1.38, eGFR 60, Na 139, K 4.0, Cl 102, CO2 24, AST 16, ALT 20  CBC: WBC 9.2, RBC 3.69, HGB 12.5, HCT 38.1, , MCH 33.9,   HbA1c: 4.7       Procedures       Advance Care Planning   ACP discussion was held with the patient during this visit. Patient does not have an advance directive, declines further assistance.           Assessment:      Diagnosis Plan   1. Coronary artery disease involving native coronary artery of native heart without angina pectoris  Stable, no current angina or CHF symptoms.  Continue dual antiplatelet therapy and rest of the current GDMT      2. Heart murmur  Echo was ordered and not approved by his insurance, he does not have any symptoms of aortic stenosis, will continue to monitor clinically      3. Palpitations  Remarkable cardiac monitor showing rare PACs and PVCs, limit caffeine consumption, CPAP compliance and continuation of carvedilol recommended      4. Essential hypertension  Controlled, continue current antihypertensive therapy.      5. Dyslipidemia  Well-controlled, continue current statin therapy.        Plan:   Stable cardiac status.  No angina or CHF symptoms, palpitations have improved.  Continue current medications.   Heart healthy diet and regular exercise and weight loss recommended.  FU in 12 MO, sooner as needed.  Thank you for allowing us to participate in the care of your patient.       John Del Castillo MD, FACC, [facility]        Please note that portions of this note may have been completed with a " voice recognition program. Efforts were made to edit the dictations, but occasionally words are mistranscribed.

## 2025-02-07 ENCOUNTER — OFFICE VISIT (OUTPATIENT)
Dept: CARDIOLOGY | Facility: CLINIC | Age: 60
End: 2025-02-07
Payer: COMMERCIAL

## 2025-02-07 VITALS
SYSTOLIC BLOOD PRESSURE: 128 MMHG | WEIGHT: 221.2 LBS | DIASTOLIC BLOOD PRESSURE: 84 MMHG | HEART RATE: 99 BPM | BODY MASS INDEX: 35.55 KG/M2 | HEIGHT: 66 IN | OXYGEN SATURATION: 96 %

## 2025-02-07 DIAGNOSIS — R01.1 HEART MURMUR: ICD-10-CM

## 2025-02-07 DIAGNOSIS — E78.5 DYSLIPIDEMIA: ICD-10-CM

## 2025-02-07 DIAGNOSIS — R00.2 PALPITATIONS: ICD-10-CM

## 2025-02-07 DIAGNOSIS — I25.10 CORONARY ARTERY DISEASE INVOLVING NATIVE CORONARY ARTERY OF NATIVE HEART WITHOUT ANGINA PECTORIS: Primary | ICD-10-CM

## 2025-02-07 DIAGNOSIS — I10 ESSENTIAL HYPERTENSION: ICD-10-CM

## 2025-02-07 PROCEDURE — 1160F RVW MEDS BY RX/DR IN RCRD: CPT | Performed by: INTERNAL MEDICINE

## 2025-02-07 PROCEDURE — 1159F MED LIST DOCD IN RCRD: CPT | Performed by: INTERNAL MEDICINE

## 2025-02-07 PROCEDURE — 3079F DIAST BP 80-89 MM HG: CPT | Performed by: INTERNAL MEDICINE

## 2025-02-07 PROCEDURE — 99214 OFFICE O/P EST MOD 30 MIN: CPT | Performed by: INTERNAL MEDICINE

## 2025-02-07 PROCEDURE — 3074F SYST BP LT 130 MM HG: CPT | Performed by: INTERNAL MEDICINE

## 2025-02-07 RX ORDER — FLUTICASONE PROPIONATE 110 UG/1
2 AEROSOL, METERED RESPIRATORY (INHALATION)
COMMUNITY
Start: 2025-01-30

## 2025-04-02 ENCOUNTER — OFFICE VISIT (OUTPATIENT)
Dept: PAIN MEDICINE | Facility: CLINIC | Age: 60
End: 2025-04-02
Payer: COMMERCIAL

## 2025-04-02 VITALS — BODY MASS INDEX: 35.44 KG/M2 | WEIGHT: 220.5 LBS | HEIGHT: 66 IN

## 2025-04-02 DIAGNOSIS — M50.30 DDD (DEGENERATIVE DISC DISEASE), CERVICAL: ICD-10-CM

## 2025-04-02 DIAGNOSIS — M47.812 CERVICAL SPONDYLOSIS WITHOUT MYELOPATHY: ICD-10-CM

## 2025-04-02 DIAGNOSIS — M99.51 INTERVERTEBRAL DISC STENOSIS OF NEURAL CANAL OF CERVICAL REGION: ICD-10-CM

## 2025-04-02 DIAGNOSIS — E66.9 MILD OBESITY: ICD-10-CM

## 2025-04-02 PROCEDURE — 1125F AMNT PAIN NOTED PAIN PRSNT: CPT | Performed by: NURSE PRACTITIONER

## 2025-04-02 PROCEDURE — 99214 OFFICE O/P EST MOD 30 MIN: CPT | Performed by: NURSE PRACTITIONER

## 2025-04-02 PROCEDURE — 1160F RVW MEDS BY RX/DR IN RCRD: CPT | Performed by: NURSE PRACTITIONER

## 2025-04-02 PROCEDURE — 1159F MED LIST DOCD IN RCRD: CPT | Performed by: NURSE PRACTITIONER

## 2025-04-02 RX ORDER — MELOXICAM 7.5 MG/1
7.5 TABLET ORAL 2 TIMES DAILY PRN
Qty: 60 TABLET | Refills: 1 | Status: SHIPPED | OUTPATIENT
Start: 2025-04-02

## 2025-04-02 NOTE — PROGRESS NOTES
"Chief Complaint: \" Neck and right arm pain.\"    History of Present Illness:   Patient: Mr. Charles Finney, 59 y.o. male originally referred by Dr. Best in consultation for intractable chronic neck and right shoulder pain. He reports a several year history of pain, which began without incident.  I saw him in follow-up consultation on 3/13/2024.  At that point, he continued to experience symptoms in his neck which extends into his shoulders, as well as his axilla region.  Previously due to his symptoms we opted to proceed with a cervical epidural steroid injection, unfortunately his insurance denied this request and required physical therapy.  He participated in about 4 visits of physical therapy at the end of 2022, in the beginning of 2023, which made his pain worse.  He was previously on gabapentin, which he titrated off in 2023.  More recently, he was staying in the hospital with a family member, particularly at night, and due to the sleeping conditions he began to experience severe pain from his neck radiating into his right arm.  He experiences pins-and-needles.  He engaged in a new course of physical therapy beginning in April 2024 (4/17/2024, 05/02/2024, 05/14/2024, and 07/11/2024), and he has continued a daily home exercise regimen from his physical therapist.  When I last saw him I ordered an updated MRI of the cervical spine which was performed on 10/27/2024, and demonstrates multilevel cervical spondylosis.  There is straightening of the normal lordosis.  Diffuse facet arthritis.  At C3-C4 there is a disc osteophyte complex formation greater on the left with facet hypertrophy creating severe central spinal stenosis with severe bilateral neuroforaminal stenosis.  At C4-C5 and C5-C6 there are disc osteophyte complex formations with facet hypertrophy creating moderate to severe central spinal stenosis with severe bilateral neuroforaminal stenosis.  At C6-C7 there is a disc osteophyte complex formation with " "facet arthritis with moderate central spinal stenosis and moderate right and severe left neuroforaminal stenosis.  Findings have mildly progressed since his MRI in 2020.  His symptoms remain primarily mechanical at his last office visit, therefore I recommended cervical medial branch blocks and rhizotomy.  He was scheduled to undergo his medial branch blocks on 11/27/2024, but canceled due to transportation.  He opted to not reschedule, and presents today for follow-up evaluation.  Pain description: Constant neck and shoulder pain with intermittent exacerbation, described as aching, \"pins-and-needles\" and burning sensation.   Radiation of pain: The pain radiates from the neck to the shoulders and into the right shoulder blade and into his right arm.    Pain intensity today: 5/10   Average pain intensity last week: 6/10   Pain intensity ranges from: 4/10 to 7/10  Aggravating factors: Pain increases with extension, flexion and rotation of the cervical spine, and driving   Alleviating factors: Pain decreases with analgesics, hot shower, massager, and ice.   Associated symptoms:   Patient denies pain, numbness and weakness   Patient denies any new bladder or bowel problems.   Patient denies difficulties with his balance or recent falls.   Patient denies headaches.  Pain interferes with regular activities, ADLs, and affects patient's quality of life  Stiffness  Pain interferes with sleep    Review of previous therapies and additional medical records:  Charles Finney has already failed the following measures, including:   Conservative measures: oral analgesics, topical analgesics, physical therapy, ice, heat and home exercises, NSAIDs   Interventional measures: 10/29/2018: cervical epidural steroid injection at C6-C7 by right interlaminar approach  07/27/2020: SERGIO  Surgical measures: No history of cervical spine surgery.  Charles Finney underwent neurosurgical  consultation with Dr. Best on 10/16/2020, and was found " not to be a surgical candidate at this time.  Charles Finney has a history of hypertension, cardiac stent on Brilinta and is engaged in treatment.  In terms of current analgesics, Charles Finney takes: gabapentin, and Tylenol and SalonPas  I have reviewed the [facility] Report is consistent to medication reconciliation.    Global Pain Scale 10-11 2018 11-26 2018 05-08 2019 01-08 2020 08-27 2020 05-26 2022 11-29 2022 02-13 2024 09-30 2024    Pain 12 4 6  9  12  5  11  10 12    Feelings 0 0 0  0  0  0  0  0 0    Clinical outcomes 19 0 4  8  12  4  12  7 16    Activities 18 0 0  7  6  9  13  7 4    GPS Total: 49 4 10  24  30  18  36  24 32        NECK PAIN DISABILITY INDEX QUESTIONNAIRE  DATE 09-30 2024            Pain intensity  0: No pain  1: Mild  2: Moderate  3: Fairly severe  4: very severe  5: Worst imaginable 2            Personal Care   0: I can look after myself normally without causing extra pain.  1: I can look after myself normally, but it causes extra pain.  2: It is painful to look after myself and I am slow and careful.  3: I need some help, but manage most of my personal care.  4: I need help every day in most aspects of self-care.  5: I do not get dressed; I wash with difficulty and stay in bed. 1            Lifting  0: I can lift heavy weights without extra pain.  1: I can lift heavy weights, but it gives extra pain.  2: Pain prevents me from lifting heavy weights off the floor but I can manage if they are conveniently positioned, for example, on a table.  3: Pain prevents me from lifting heavy weights, but I can manage light to medium weights if they are conveniently positioned.  4: I can lift very light weights.  5: I cannot lift or carry anything at all. 1            Reading  0: I can read as much as I want to with no pain in my neck.  1: I can read as much as I want to with slight pain in my neck.  2: I can read as much as I want to with moderate pain in my neck.  3: I cannot read as  much as I want because of moderate pain in my neck.  4: I cannot read as much as I want because of severe pain in my neck.  5: I cannot read at all. 3            Headaches  0: I have no headaches at all.  1: I have slight headaches which come infrequently.  2: I have moderate headaches which come infrequently.  3: I have moderate headaches which come frequently.  4: I have severe headaches which come frequently.  5: I have headaches almost all the time. 0            Concentration  0: I can concentrate fully when I want to with no difficulty.  1: I can concentrate fully when I want to with slight difficulty.  2: I have a fair degree of difficulty in concentrating when I want to.  3: I have a lot of difficulty in concentrating when I want to.  4: I have a great deal of difficulty in concentrating when I want to.  5: I cannot concentrate at all. 2            Work  0: I can do as much work as I want to.  1: I can only do my usual work, but no more.  2: I can do most of my usual work, but no more.  3: I cannot do my usual work.  4: I can hardly do any work at all.  5: I cannot do any work at all. 2            Driving  0: I can drive my car without any neck pain.  1: I can drive my car as long as I want with slight pain in my neck.  2: I can drive my car as long as I want with moderate pain in my   neck.  3: I cannot drive my car as long as I want because of moderate pain   in my neck.  4: I can hardly drive at all because of severe pain in my neck.  5: I cannot drive my car at all. 3            Sleeping  0: I have no trouble sleeping.  1: My sleep is slightly disturbed (less than 1 hour sleepless).  2: My sleep is mildly disturbed (1-2 hours sleepless).  3: My sleep is moderately disturbed (2-3 hours sleepless).  4: My sleep is greatly disturbed (3-5 hours sleepless).  5: My sleep is completely disturbed (5-7 hours) 3            Recreation  0: I am able to engage in all of my recreational activities with no neck pain at  all.  1: I am able to engage in all of my recreational activities with some pain in my neck.  2: I am able to engage in most, but not all of my recreational activities because of pain in my neck.  3: I am able to engage in a few of my recreational activities because of pain in my neck.  4: I can hardly do any recreational activities because of pain in my neck.  5: I cannot do any recreational activities at all. 4            TOTAL SCORE 21             Review of New Diagnostic Studies:  MRI of the cervical spine without contrast 10/27/2024: multilevel cervical spondylosis.  There is straightening of the normal lordosis.  Diffuse facet arthritis.  At C3-C4 there is a disc osteophyte complex formation greater on the left with facet hypertrophy creating severe central spinal stenosis with severe bilateral neuroforaminal stenosis.  At C4-C5 and C5-C6 there are disc osteophyte complex formations with facet hypertrophy creating moderate to severe central spinal stenosis with severe bilateral neuroforaminal stenosis.  At C6-C7 there is a disc osteophyte complex formation with facet arthritis with moderate central spinal stenosis and moderate right and severe left neuroforaminal stenosis.  Findings have mildly progressed since his MRI in 2020.    Cervical spine x-rays with flexion-extension views 9/30/2024: Straightening of the normal cervical lordosis.  Anterior listhesis of C2 on C3.  Diffuse degenerative disc disease most advanced at C6-C7 there is facet arthritis diffusely throughout, moderate to severe foraminal stenosis seen at C3-C4 through C6-C7.  No instability.    Review of Diagnostic Studies:  Cervical spine x-rays with flexion-extension views 11/29/2022: Moderate posterior facet arthritis, no evidence of instability.  Bilateral shoulder x-rays 11/29/2022: Moderate glenohumeral osteoarthritis seen within the left shoulder joint  MRI of the cervical spine without contrast on 10/7/2020: I have reviewed the images.   Mild degenerative spurring throughout.  C2-C3: Mild endplate spurring is present.  No spinal canal or foraminal stenosis is seen.  C3-C4: Mild endplate spurring, with disc bulge no spinal canal or foraminal stenosis is seen.  C4-C5: There is a broad based central disc protrusion, along with mild endplate spurring and mild spinal stenosis.  There is mild bilateral neural foraminal narrowing seen.  C5-C6: Left broad-based disc protrusion is present, creating mild crowding of the left nerve root, with mild spinal canal stenosis and mild bilateral neural foraminal stenosis.  C6-C7: Herniated disc protrusion, creating mild to moderate central spinal canal stenosis, there is mild endplate spurring, and moderate bilateral neuroforaminal stenosis.  C7-T1: Normal.      Review of Systems   Musculoskeletal:  Positive for arthralgias, back pain, neck pain and neck stiffness.   Allergic/Immunologic: Positive for environmental allergies and food allergies.   All other systems reviewed and are negative.     Patient Active Problem List   Diagnosis    Cervical stenosis of spinal canal    Foraminal stenosis of cervical region    Cervical spondylosis without myelopathy    DDD (degenerative disc disease), cervical    Essential hypertension    Morbidly obese    Mild intermittent asthma without complication    Coronary artery disease involving native coronary artery of native heart without angina pectoris    Dyslipidemia    Heart murmur    Palpitations     Past Medical History:   Diagnosis Date    Asthma     Back problem     Cervical disc disorder     Chronic pain disorder     Coronary artery disease     Deep vein thrombosis     Extremity pain Sept 30 2018    Neck and shoulder spreading all the way to hand.    Heart murmur     Hyperlipidemia     Hypertension     Joint pain     Low back pain     Lumbosacral disc disease     Neck pain     Sleep apnea     Spinal stenosis     Umbilical hernia       Past Surgical History:   Procedure  Laterality Date    CARDIAC CATHETERIZATION  04/2022    with stent placement    CORONARY STENT PLACEMENT      CYSTOSCOPY BLADDER STONE LITHOTRIPSY      KIDNEY STONE SURGERY  01/01/2013    Lithotripsy    KNEE CARTILAGE SURGERY  01/01/1991    NASAL SEPTUM SURGERY  05/01/2016    ORTHOPEDIC SURGERY  1990    Knee scope    TONSILLECTOMY      UMBILICAL HERNIA REPAIR  01/01/1999         Family History   Problem Relation Age of Onset    Breast cancer Mother     Cancer Mother         Breast cancer    Early death Mother         Breast cancer    Heart attack Mother         44 years old    Diabetes Father     Kidney failure Father     COPD Father     Stroke Father         Nov 2016    Diabetes Brother     Early death Brother         Diabetes complications    Heart disease Brother         Arteriosclerosis         Social History     Socioeconomic History    Marital status: Single   Tobacco Use    Smoking status: Never     Passive exposure: Past    Smokeless tobacco: Never   Vaping Use    Vaping status: Never Used   Substance and Sexual Activity    Alcohol use: Not Currently     Comment: Maybe 2 drinks a month or less    Drug use: Never    Sexual activity: Not Currently     Partners: Female     Birth control/protection: Condom, Birth control pill     Comment: Not currently involved        Current Outpatient Medications:     albuterol (PROVENTIL HFA;VENTOLIN HFA) 108 (90 BASE) MCG/ACT inhaler, Inhale 2 puffs Every 4 (Four) Hours As Needed for Wheezing., Disp: , Rfl:     atorvastatin (LIPITOR) 40 MG tablet, Take 1 tablet by mouth Daily., Disp: , Rfl:     Gonzales Low Dose 81 MG EC tablet, Take 1 tablet by mouth Daily., Disp: , Rfl:     Brilinta 90 MG tablet tablet, Take 1 tablet by mouth 2 (Two) Times a Day., Disp: , Rfl:     carvedilol (COREG) 12.5 MG tablet, Take 1 tablet by mouth 2 (Two) Times a Day With Meals., Disp: , Rfl:     cyanocobalamin 1000 MCG/ML injection, Inject 1 mL into the appropriate muscle as directed by prescriber  "Every 30 (Thirty) Days., Disp: , Rfl:     fenofibrate (TRICOR) 145 MG tablet, Take 1 tablet by mouth Daily., Disp: , Rfl:     fluticasone (FLONASE) 50 MCG/ACT nasal spray, Administer 2 sprays into the nostril(s) as directed by provider Daily., Disp: , Rfl:     fluticasone (FLOVENT HFA) 110 MCG/ACT inhaler, Inhale 2 puffs 2 (Two) Times a Day., Disp: , Rfl:     folic acid (FOLVITE) 1 MG tablet, TAKE 1 TABLET BY MOUTH ONCE DAILY . DO NOT EXCEED 1 PER 24 HOURS, Disp: , Rfl:     ibuprofen (ADVIL,MOTRIN) 800 MG tablet, TAKE 1 TABLET BY MOUTH EVERY 8 HOURS WITH FOOD AS NEEDED, Disp: , Rfl:     ipratropium (ATROVENT) 0.03 % nasal spray, USE 2 SPRAY(S) IN EACH NOSTRIL THREE TIMES DAILY, Disp: , Rfl:     losartan-hydrochlorothiazide (HYZAAR) 100-25 MG per tablet, Take 1 tablet by mouth Daily., Disp: , Rfl:     nitroglycerin (NITROSTAT) 0.4 MG SL tablet, PLACE 1 TABLET UNDER THE TONGUE AS NEEDED AS DIRECTED, Disp: , Rfl:     nortriptyline (PAMELOR) 25 MG capsule, TAKE 1 TO 2 CAPSULES BY MOUTH AT BEDTIME AS NEEDED FOR SLEEP, Disp: 180 capsule, Rfl: 0    tiZANidine (ZANAFLEX) 2 MG tablet, Take 1 tablet by mouth Every 8 (Eight) Hours As Needed for Muscle Spasms. Start 1/2 tab QHS, then continue 1/2 tab to 1 tab TID PRN muscle spasms, Disp: 90 tablet, Rfl: 0    meloxicam (MOBIC) 7.5 MG tablet, Take 1 tablet by mouth 2 (Two) Times a Day As Needed for Moderate Pain., Disp: 60 tablet, Rfl: 1      Allergies   Allergen Reactions    Other      Environmental allergies      Penicillins Unknown - Low Severity and Unknown (See Comments)         Ht 167.6 cm (65.98\")   Wt 100 kg (220 lb 8 oz)   BMI 35.61 kg/m²          Physical Exam:   Constitutional: Patient is oriented to person, place, and time. Vital signs are normal. Patient appears well-developed and well-nourished.   HENT: Head: Normocephalic and atraumatic. Eyes: Conjunctivae and lids are normal. Pupils: Equal, round, reactive to light.   Neck: Trachea normal. Neck supple. No JVD " present.   Pulmonary Respiratory effort: No increased work of breathing or signs of respiratory distress.  Lymphatic: No cervical adenopathy  Musculoskeletal   Gait and station: Gait evaluation demonstrated a normal gait.   Cervical spine: Passive and active range of motion are limited secondary to pain. Extension, flexion, lateral flexion, rotation of the cervical spine increased and reproduced pain. Cervical facet joint loading maneuvers are positive.   Muscles: Presence of active trigger points: None   Shoulders: The range of motion of the glenohumeral joints is somewhat limited with pain. Rotator cuff strength is 5/5.   Neurological:   Patient is alert and oriented to person, place, and time. Speech: speech is normal. Cortical function: Normal mental status.   Cranial nerves: Cranial nerves 2-12 intact.   Reflex Scores:  Right brachioradialis: 1+  Left brachioradialis: 1+  Right biceps: 1+  Left biceps: 1+  Right triceps: 1+  Left triceps: 1+  Motor strength: 5/5  Motor Tone: normal tone.   Involuntary movements: none.   Superficial/Primitive Reflexes: primitive reflexes were absent.   Right Pierre: absent  Left Pierre: absent  Right ankle clonus: absent  Left ankle clonus: absent   Spurling sign is positive. Lhermitte sign is negative. Neck tornado test is negative. Negative long tract signs.    Sensation: No sensory loss. Sensory exam: intact to light touch, intact pain and temperature sensation, intact vibration sensation and normal proprioception.   Coordination: Normal finger to nose and heel to shin. Normal balance and negative. Romberg's sign negative.   Skin and subcutaneous tissue: Skin is warm and intact. No rash noted. No cyanosis.   Psychiatric: Judgment and insight: Normal.  Orientation to person, place and time: Normal. Recent and remote memory: Intact.   Mood and affect: Normal.     ASSESSMENT:   1. Cervical spondylosis without myelopathy    2. Intervertebral disc stenosis of neural canal of  cervical region    3. DDD (degenerative disc disease), cervical    4. Mild obesity                PLAN/MEDICAL DECISION MAKING: Mr. Charles Finney, 59 y.o. male reports a several year history of pain, which began without incident.  I saw him in follow-up consultation on 3/13/2024.  At that point, he continued to experience symptoms in his neck which extends into his shoulders, as well as his axilla region.  Previously due to his symptoms we opted to proceed with a cervical epidural steroid injection, unfortunately his insurance denied this request and required physical therapy.  He participated in about 4 visits of physical therapy at the end of 2022, in the beginning of 2023, which made his pain worse.  He was previously on gabapentin, which he titrated off in 2023.  More recently, he was staying in the hospital with a family member, particularly at night, and due to the sleeping conditions he began to experience severe pain from his neck radiating into his right arm.  He experiences pins-and-needles.  He engaged in a new course of physical therapy beginning in April 2024 (4/17/2024, 05/02/2024, 05/14/2024, and 07/11/2024), and he has continued a daily home exercise regimen from his physical therapist.  When I last saw him I ordered an updated MRI of the cervical spine which was performed on 10/27/2024, and demonstrates multilevel cervical spondylosis.  There is straightening of the normal lordosis.  Diffuse facet arthritis.  At C3-C4 there is a disc osteophyte complex formation greater on the left with facet hypertrophy creating severe central spinal stenosis with severe bilateral neuroforaminal stenosis.  At C4-C5 and C5-C6 there are disc osteophyte complex formations with facet hypertrophy creating moderate to severe central spinal stenosis with severe bilateral neuroforaminal stenosis.  At C6-C7 there is a disc osteophyte complex formation with facet arthritis with moderate central spinal stenosis and moderate  right and severe left neuroforaminal stenosis.  Findings have mildly progressed since his MRI in 2020.  His symptoms remain primarily mechanical at his last office visit, therefore I recommended cervical medial branch blocks and rhizotomy.  He was scheduled to undergo his medial branch blocks on 11/27/2024, but canceled due to transportation.  Unfortunately, it is difficult for him to find transportation, so at this time the has declined the procedure.  We are going to trial some meloxicam, and see if this will help him with some of his symptoms.  I had a lengthy conversation with Mr. Charles Finney regarding his chronic pain condition and potential therapeutic options including risks, benefits, alternative therapies, to name a few. Patient has failed to obtain pain relief with conservative measures. I have reviewed all available patient's medical records as well as previous therapies as referenced above.  Therefore, I have proposed the following plan:  1. Interventional pain management measures: None indicated at this time.  We continue discussed prospects of diagnostic bilateral cervical medial branch blocks at C4, C5, C6; for bilateral cervical facet joints at C4-C5, C5-C6, to clarify the origin of chronic refractory mechanical/axial cervicalgia. If patient experiences more than 80% pain relief along with significant improvement in the range of motion of the cervical spine, then, patient will be scheduled for a second set of diagnostic bilateral cervical medial branch blocks, to then, proceed with bilateral cervical medial branch rhizotomies Patient will need to stop ticagrelor (Brilinta) at least 7 days between last dose and procedure (we will request clearance from Dr. Bridges with cardiology) in order to proceed with cervical epidural steroid injection at C6-C7 by interlaminar approach. We may repeat another epidural depending on patient's outcome.    2. Long-term rehabilitation efforts:  A. The patient does  not have a history of falls. I did complete a risk assessment for falls.   B. Patient will start a comprehensive physical therapy program for upper body strengthening/posture correction, gait and balance training, neurodynamics, E-STIM, myofascial release, cupping and dry needling   C. Start an exercise program such as daily walks for fitness  4. Pharmacological measures.  Patient takes:  A. Continue nortriptyline to 25 mg 1-2 tablets at bedtime as needed for insomnia  B.  Trial with meloxicam 7.5 mg twice daily  5. The patient has been instructed to contact my office with any questions or difficulties. The patient understands the plan and agrees to proceed accordingly.      Charles Finney reports a pain score of 6.  Given his pain assessment as noted, treatment options were discussed and the following options were decided upon as a follow-up plan to address the patient's pain: continuation of current treatment plan for pain, home exercises and therapy, referral to Physical Therapy, use of non-medical modalities (ice, heat, stretching and/or behavior modifications), and updated diagnostics .    Pain Medications              Gonzales Low Dose 81 MG EC tablet Take 1 tablet by mouth Daily.    ibuprofen (ADVIL,MOTRIN) 800 MG tablet TAKE 1 TABLET BY MOUTH EVERY 8 HOURS WITH FOOD AS NEEDED    nortriptyline (PAMELOR) 25 MG capsule TAKE 1 TO 2 CAPSULES BY MOUTH AT BEDTIME AS NEEDED FOR SLEEP    tiZANidine (ZANAFLEX) 2 MG tablet Take 1 tablet by mouth Every 8 (Eight) Hours As Needed for Muscle Spasms. Start 1/2 tab QHS, then continue 1/2 tab to 1 tab TID PRN muscle spasms    meloxicam (MOBIC) 7.5 MG tablet Take 1 tablet by mouth 2 (Two) Times a Day As Needed for Moderate Pain.                 Patient Care Team:  Caridad Kaba MD as PCP - General (Family Medicine)  Rakesh Best MD as Consulting Physician (Neurosurgery)  John Del Castillo MD as Consulting Physician (Cardiology)  Latrell Solis MD as Consulting  Physician (Anesthesiology)  Stephany Stevenson APRN as Nurse Practitioner (Pain Medicine)       New Medications Ordered This Visit   Medications    meloxicam (MOBIC) 7.5 MG tablet     Sig: Take 1 tablet by mouth 2 (Two) Times a Day As Needed for Moderate Pain.     Dispense:  60 tablet     Refill:  1           Future Appointments   Date Time Provider Department Center   2/13/2026 10:00 AM John Del Castillo MD [facility]           MARNIE Jacobson

## 2025-04-29 ENCOUNTER — TELEPHONE (OUTPATIENT)
Dept: CARDIOLOGY | Facility: CLINIC | Age: 60
End: 2025-04-29
Payer: COMMERCIAL

## 2025-04-29 NOTE — TELEPHONE ENCOUNTER
Spoke with pt. Has hx of palpitations but stated had an episode one week ago, and then again on Saturday where the heart flutters continued for a few hours. At the time felt a mild pressure/pinch in his chest. While happening he said he rested and watched TV until it went away. Denies any other s/s. Did not check BP or HR. Advised pt if it happens again to get an EKG done at a [facility] office, his PCP office, or a fire station. Advised if he becomes symptomatic with lightheadedness, dizziness, presyncope to go to the ER. He voiced understanding.

## 2025-04-29 NOTE — TELEPHONE ENCOUNTER
Received VM from pt concerning heart flutters that lasted the whole afternoon on Saturday, and feels like it takes his breath away. Left VM for pt to return my call.

## 2025-05-01 NOTE — TELEPHONE ENCOUNTER
If palpitations begin occurring frequently, we can place another Holter monitor.  If they remain infrequent and are sustained episodes, agree with reporting to a facility for ECG.  If he has any other associated symptoms, call our office or report the emergency department.

## 2025-05-13 RX ORDER — TIZANIDINE 2 MG/1
2 TABLET ORAL EVERY 8 HOURS PRN
Qty: 90 TABLET | Refills: 0 | Status: SHIPPED | OUTPATIENT
Start: 2025-05-13

## 2025-07-15 RX ORDER — MELOXICAM 7.5 MG/1
7.5 TABLET ORAL 2 TIMES DAILY PRN
Qty: 60 TABLET | Refills: 0 | Status: SHIPPED | OUTPATIENT
Start: 2025-07-15

## 2025-08-19 RX ORDER — MELOXICAM 7.5 MG/1
7.5 TABLET ORAL 2 TIMES DAILY PRN
Qty: 60 TABLET | Refills: 0 | Status: SHIPPED | OUTPATIENT
Start: 2025-08-19